# Patient Record
Sex: MALE | Race: WHITE | NOT HISPANIC OR LATINO | Employment: FULL TIME | URBAN - METROPOLITAN AREA
[De-identification: names, ages, dates, MRNs, and addresses within clinical notes are randomized per-mention and may not be internally consistent; named-entity substitution may affect disease eponyms.]

---

## 2017-01-01 ENCOUNTER — TRANSFERRED RECORDS (OUTPATIENT)
Dept: HEALTH INFORMATION MANAGEMENT | Facility: CLINIC | Age: 27
End: 2017-01-01

## 2017-01-13 DIAGNOSIS — E10.9 TYPE 1 DIABETES MELLITUS WITHOUT COMPLICATION (H): Primary | ICD-10-CM

## 2017-01-17 ENCOUNTER — OFFICE VISIT (OUTPATIENT)
Dept: FAMILY MEDICINE | Facility: OTHER | Age: 27
End: 2017-01-17
Payer: COMMERCIAL

## 2017-01-17 VITALS
HEART RATE: 72 BPM | RESPIRATION RATE: 20 BRPM | SYSTOLIC BLOOD PRESSURE: 122 MMHG | HEIGHT: 71 IN | DIASTOLIC BLOOD PRESSURE: 68 MMHG | WEIGHT: 204.8 LBS | BODY MASS INDEX: 28.67 KG/M2 | TEMPERATURE: 95.4 F

## 2017-01-17 DIAGNOSIS — E10.9 TYPE 1 DIABETES MELLITUS WITHOUT COMPLICATION (H): Primary | ICD-10-CM

## 2017-01-17 DIAGNOSIS — E78.5 HYPERLIPIDEMIA LDL GOAL <100: ICD-10-CM

## 2017-01-17 LAB
ALBUMIN SERPL-MCNC: 4 G/DL (ref 3.4–5)
ALP SERPL-CCNC: 65 U/L (ref 40–150)
ALT SERPL W P-5'-P-CCNC: 30 U/L (ref 0–70)
ANION GAP SERPL CALCULATED.3IONS-SCNC: 4 MMOL/L (ref 3–14)
AST SERPL W P-5'-P-CCNC: 21 U/L (ref 0–45)
BILIRUB SERPL-MCNC: 0.4 MG/DL (ref 0.2–1.3)
BUN SERPL-MCNC: 11 MG/DL (ref 7–30)
CALCIUM SERPL-MCNC: 8.7 MG/DL (ref 8.5–10.1)
CHLORIDE SERPL-SCNC: 107 MMOL/L (ref 94–109)
CHOLEST SERPL-MCNC: 137 MG/DL
CO2 SERPL-SCNC: 31 MMOL/L (ref 20–32)
CREAT SERPL-MCNC: 0.89 MG/DL (ref 0.66–1.25)
ERYTHROCYTE [DISTWIDTH] IN BLOOD BY AUTOMATED COUNT: 12.6 % (ref 10–15)
GFR SERPL CREATININE-BSD FRML MDRD: ABNORMAL ML/MIN/1.7M2
GLUCOSE SERPL-MCNC: 101 MG/DL (ref 70–99)
HBA1C MFR BLD: 9.4 % (ref 4.3–6)
HCT VFR BLD AUTO: 41 % (ref 40–53)
HDLC SERPL-MCNC: 51 MG/DL
HGB BLD-MCNC: 14 G/DL (ref 13.3–17.7)
LDLC SERPL CALC-MCNC: 78 MG/DL
MCH RBC QN AUTO: 29.5 PG (ref 26.5–33)
MCHC RBC AUTO-ENTMCNC: 34.1 G/DL (ref 31.5–36.5)
MCV RBC AUTO: 86 FL (ref 78–100)
NONHDLC SERPL-MCNC: 86 MG/DL
PLATELET # BLD AUTO: 280 10E9/L (ref 150–450)
POTASSIUM SERPL-SCNC: 3.7 MMOL/L (ref 3.4–5.3)
PROT SERPL-MCNC: 7.1 G/DL (ref 6.8–8.8)
RBC # BLD AUTO: 4.75 10E12/L (ref 4.4–5.9)
SODIUM SERPL-SCNC: 142 MMOL/L (ref 133–144)
TRIGL SERPL-MCNC: 42 MG/DL
TSH SERPL DL<=0.005 MIU/L-ACNC: 3.61 MU/L (ref 0.4–4)
WBC # BLD AUTO: 3.9 10E9/L (ref 4–11)

## 2017-01-17 PROCEDURE — 84443 ASSAY THYROID STIM HORMONE: CPT | Performed by: PHYSICIAN ASSISTANT

## 2017-01-17 PROCEDURE — 36415 COLL VENOUS BLD VENIPUNCTURE: CPT | Performed by: PHYSICIAN ASSISTANT

## 2017-01-17 PROCEDURE — 80061 LIPID PANEL: CPT | Performed by: PHYSICIAN ASSISTANT

## 2017-01-17 PROCEDURE — 83036 HEMOGLOBIN GLYCOSYLATED A1C: CPT | Performed by: PHYSICIAN ASSISTANT

## 2017-01-17 PROCEDURE — 99207 C FOOT EXAM  NO CHARGE: CPT | Performed by: PHYSICIAN ASSISTANT

## 2017-01-17 PROCEDURE — 85027 COMPLETE CBC AUTOMATED: CPT | Performed by: PHYSICIAN ASSISTANT

## 2017-01-17 PROCEDURE — 99214 OFFICE O/P EST MOD 30 MIN: CPT | Performed by: PHYSICIAN ASSISTANT

## 2017-01-17 PROCEDURE — 80053 COMPREHEN METABOLIC PANEL: CPT | Performed by: PHYSICIAN ASSISTANT

## 2017-01-17 ASSESSMENT — PAIN SCALES - GENERAL: PAINLEVEL: NO PAIN (0)

## 2017-01-17 NOTE — MR AVS SNAPSHOT
"              After Visit Summary   2017    Kashif Rodriguez    MRN: [id number]           Patient Information     Date Of Birth          1990        Visit Information        Provider Department      2017 10:00 AM Miguel Pride PA-C [facility]        Today's Diagnoses     Type 1 diabetes mellitus without complication (H)    -  1     Hyperlipidemia LDL goal <100            Follow-ups after your visit        Who to contact     If you have questions or need follow up information about today's clinic visit or your schedule please contact [facility] directly at [phone].  Normal or non-critical lab and imaging results will be communicated to you by [employer], letter or phone within 4 business days after the clinic has received the results. If you do not hear from us within 7 days, please contact the clinic through [employer] or phone. If you have a critical or abnormal lab result, we will notify you by phone as soon as possible.  Submit refill requests through [employer] or call your pharmacy and they will forward the refill request to us. Please allow 3 business days for your refill to be completed.          Additional Information About Your Visit        MyChart Information     [employer] lets you send messages to your doctor, view your test results, renew your prescriptions, schedule appointments and more. To sign up, go to [email or web address] . Click on \"Log in\" on the left side of the screen, which will take you to the Welcome page. Then click on \"Sign up Now\" on the right side of the page.     You will be asked to enter the access code listed below, as well as some personal information. Please follow the directions to create your username and password.     Your access code is: [email or web address]  Expires: 2017 10:26 AM     Your access code will  in 90 days. If you need help or a new code, please call your [facility] or [phone].        Care EveryWhere ID     " "This is your Care EveryWhere ID. This could be used by other organizations to access your [place] medical records  [id number]        Your Vitals Were     Pulse Temperature Respirations Height BMI (Body Mass Index)       72 95.4  F (35.2  C) (Oral) 20 5' 11.25\" (1.81 m) 28.36 kg/m2        Blood Pressure from Last 3 Encounters:   01/17/17 122/68   09/11/15 114/70   07/14/15 102/76    Weight from Last 3 Encounters:   01/17/17 204 lb 12.8 oz (92.897 kg)   09/11/15 200 lb 11.2 oz (91.037 kg)   07/14/15 195 lb 4.8 oz (88.587 kg)              We Performed the Following     Albumin Random Urine Quantitative     CBC with platelets     Comprehensive metabolic panel     Hemoglobin A1c     Lipid Profile with reflex to direct LDL     TSH with free T4 reflex          Today's Medication Changes          These changes are accurate as of: 1/17/17 10:26 AM.  If you have any questions, ask your nurse or doctor.               Stop taking these medicines if you haven't already. Please contact your care team if you have questions.     insulin lispro 100 UNIT/ML injection   Commonly known as:  humaLOG   Stopped by:  Miguel Pride PA-C           Insulin Lispro 200 UNIT/ML soln   Commonly known as:  HUMALOG KWIKPEN   Stopped by:  Miguel Pride PA-C                Where to get your medicines      These medications were sent to [id number] - [place], [place] - [address] [place] [address]  [address] [place] [id number]    Hours:  M-F 8:30-6:30; Sat 9-4; closed Sunday Phone:  [phone]    - insulin aspart 100 UNIT/ML injection             Primary Care Provider    None       No address on file        Thank you!     Thank you for choosing [facility]  for your care. Our goal is always to provide you with excellent care. Hearing back from our patients is one way we can continue to improve our services. Please take a few minutes to complete the written survey that you may receive in the mail after your visit with us. Thank you!      "        Your Updated Medication List - Protect others around you: Learn how to safely use, store and throw away your medicines at www.disposemymeds.org.          This list is accurate as of: 1/17/17 10:26 AM.  Always use your most recent med list.                   Brand Name Dispense Instructions for use    * ACE NOT PRESCRIBED (INTENTIONAL)      by Other route continuous prn.       * ASPIRIN NOT PRESCRIBED    INTENTIONAL    0 each    continuous prn Antiplatelet medication not prescribed intentionally due to Intolerance (headache)       MAGALI CONTOUR NEXT test strip   Generic drug:  blood glucose monitoring     100 strip    TEST 4 TIMES A DAY       CONTOUR BLOOD GLUCOSE SYSTEM W/DEVICE Kit     1 Kit    Test up to 4 times daily as needed.       glucagon 1 MG kit    GLUCAGON EMERGENCY    1 mg    Inject 1 mg into the muscle once for 1 dose       insulin aspart 100 UNIT/ML injection    NovoLOG    1 vial    INJECT SUB-Q 80 UNITS DAILY PER INSULIN PUMP AND SLIDING SCALE AS NEEDED       loratadine 10 MG tablet    CLARITIN    30 tablet    Take 1 tablet (10 mg) by mouth daily       STATIN NOT PRESCRIBED (INTENTIONAL)      Statin not prescribed intentionally due to Not indicated based on age       * Notice:  This list has 2 medication(s) that are the same as other medications prescribed for you. Read the directions carefully, and ask your doctor or other care provider to review them with you.

## 2017-01-17 NOTE — PROGRESS NOTES
"  SUBJECTIVE:                                                    Kashif Rodriguez is a 26 year old male who presents to clinic today for the following health issues:      Diabetes Follow-up      Patient is checking blood sugars: variably    Diabetic concerns: None     Symptoms of hypoglycemia (low blood sugar): none     Paresthesias (numbness or burning in feet) or sores: No     Date of last diabetic eye exam: 5/2016       Amount of exercise or physical activity: None    Problems taking medications regularly: No    Medication side effects: none    Diet: diabetic and carbohydrate counting    Problem list and histories reviewed & adjusted, as indicated.  Additional history: as documented    Patient Active Problem List   Diagnosis     Type 1 diabetes mellitus without complication (H)     FB (foreign body)     Hyperlipidemia LDL goal <100     History reviewed. No pertinent past surgical history.    Social History   Substance Use Topics     Smoking status: Passive Smoke Exposure - Never Smoker     Smokeless tobacco: Never Used      Comment: works at [employer]      Alcohol Use: No     Family History   Problem Relation Age of Onset     Asthma Father            ROS:  Constitutional, HEENT, cardiovascular, pulmonary, gi and gu systems are negative, except as otherwise noted.    OBJECTIVE:                                                    /68 mmHg  Pulse 72  Temp(Src) 95.4  F (35.2  C) (Oral)  Resp 20  Ht 5' 11.25\" (1.81 m)  Wt 204 lb 12.8 oz (92.897 kg)  BMI 28.36 kg/m2  Body mass index is 28.36 kg/(m^2).  GENERAL: healthy, alert and no distress  EYES: Eyes grossly normal to inspection, PERRL and conjunctivae and sclerae normal  NECK: no adenopathy, no asymmetry, masses, or scars and trachea midline and normal to palpation  RESP: lungs clear to auscultation - no rales, rhonchi or wheezes  CV: regular rate and rhythm, normal S1 S2, no S3 or S4, no murmur, click or rub, no peripheral edema and peripheral pulses " strong  MS: no gross musculoskeletal defects noted, no edema  SKIN: no suspicious lesions or rashes  PSYCH: mentation appears normal, affect normal/bright  Diabetic foot exam: normal DP and PT pulses, slight trophic changes to the ball of the right foot but no ulcerative lesions and normal sensory exam    Diagnostic Test Results:  No results found for this or any previous visit (from the past 24 hour(s)).     ASSESSMENT/PLAN:                                                    Kashif was seen today for diabetes.    Diagnoses and all orders for this visit:    Type 1 diabetes mellitus without complication (H)  Comments:  needs pump adjustment and follow-up  Orders:  -     CBC with platelets  -     Comprehensive metabolic panel  -     Hemoglobin A1c  -     Lipid Profile with reflex to direct LDL  -     TSH with free T4 reflex  -     Cancel: Albumin Random Urine Quantitative  -     insulin aspart (NovoLOG) inject - to fill ambulatory pump; INJECT SUB-Q 80 UNITS DAILY PER INSULIN PUMP AND SLIDING SCALE AS NEEDED  -     DIABETES EDUCATOR REFERRAL  -     Albumin Random Urine Quantitative; Future    Hyperlipidemia LDL goal <100  -     CBC with platelets  -     Comprehensive metabolic panel  -     Hemoglobin A1c  -     Lipid Profile with reflex to direct LDL  -     TSH with free T4 reflex  -     Cancel: Albumin Random Urine Quantitative    ROV 3 months advised.  Needs to see diabetic education for pump adjustment.  Refilled medications for 3 months.  Advised that he establish care with PCP of choice.  Miguel Peña PA-C  [facility]

## 2017-01-17 NOTE — NURSING NOTE
"Chief Complaint   Patient presents with     Diabetes       Initial /68 mmHg  Pulse 72  Temp(Src) 95.4  F (35.2  C) (Oral)  Resp 20  Ht 5' 11.25\" (1.81 m)  Wt 204 lb 12.8 oz (92.897 kg)  BMI 28.36 kg/m2 Estimated body mass index is 28.36 kg/(m^2) as calculated from the following:    Height as of this encounter: 5' 11.25\" (1.81 m).    Weight as of this encounter: 204 lb 12.8 oz (92.897 kg).  BP completed using cuff size: monty GREWAL LPN      "

## 2017-01-17 NOTE — Clinical Note
[facility]  [address] [facility] [address]  Phone: [phone]          January 17, 2017    Kashif Rodriguez  [address] [facility] [address]          Dear Kashif,      LAB RESULTS:     The results of your recent multiple labs were ABNORMAL but in your case are quite acceptable.  Follow-up with diabetic education for pump management as discussed.    Recheck in 3 months advised.    If you have any further questions or problems, please contact our office.          Sincerely,      Miguel Murphy PA-C

## 2017-01-24 ENCOUNTER — TELEPHONE (OUTPATIENT)
Dept: FAMILY MEDICINE | Facility: OTHER | Age: 27
End: 2017-01-24

## 2017-01-24 DIAGNOSIS — E10.9 TYPE 1 DIABETES MELLITUS WITHOUT COMPLICATION (H): Primary | ICD-10-CM

## 2017-01-24 NOTE — TELEPHONE ENCOUNTER
Panel Management Review      Patient has the following on his problem list:       Composite cancer screening  Chart review shows that this patient is due/due soon for the following   Summary:    Patient is due/failing the following:   Diabetic eye exam, and microalbumin    Action needed:   Microalbumin ( urine test)  And diabetic eye exam.    Type of outreach:    Phone, left message for patient to call back.     Questions for provider review:    None                                                                                                                                    Brianne GREWAL LPN       Chart routed to Brianne GREWAL LPN

## 2017-01-31 NOTE — TELEPHONE ENCOUNTER
Phone, spoke to patient.  He stated he will complete the microalbumin when he sees the diabetic educator.

## 2017-02-08 ENCOUNTER — ALLIED HEALTH/NURSE VISIT (OUTPATIENT)
Dept: EDUCATION SERVICES | Facility: CLINIC | Age: 27
End: 2017-02-08
Payer: COMMERCIAL

## 2017-02-08 DIAGNOSIS — E10.9 TYPE 1 DIABETES MELLITUS WITHOUT COMPLICATION (H): Primary | ICD-10-CM

## 2017-02-08 PROCEDURE — G0108 DIAB MANAGE TRN  PER INDIV: HCPCS

## 2017-02-08 NOTE — PROGRESS NOTES
Diabetes Self Management Training: Insulin Pump Review Visit    Kashif J Bestarfranchesca presents today for evaluation of glucose control and Insulin pump download, review and adjustment of pump settings   related to Type 1 diabetes.    He is accompanied by self    Patient's diabetes management related comments/concerns: getting low after work and then eats and goes high during night. Uses bolus wizard for BG corrections but not carbs. He does 1u/10 grams just like the wizard is set at but is quicker to manual bolus.     Patient's emotional response to diabetes: expresses readiness to learn and concern for health and well-being    Patient would like this visit to be focused around the following diabetes-related behaviors and goals: Insulin pump download, review and adjustment of pump settings      ASSESSMENT:  Patient Problem List and Family Medical History reviewed for relevant medical history, current medical status, and diabetes risk factors.    Current Diabetes Management per Patient:  Insulin Pump Type: Medtronic Minimed 530G with Enlite Sensor    BG meter: Contour Next USB Link (with Medtronic Pump) meter    Taking other diabetes medications?   yes:     Diabetes Medication(s)     Diabetic Other Sig    glucagon (GLUCAGON EMERGENCY) 1 MG injection Inject 1 mg into the muscle once for 1 dose    Insulin Sig    insulin aspart (NovoLOG) inject - to fill ambulatory pump INJECT SUB-Q 80 UNITS DAILY PER INSULIN PUMP AND SLIDING SCALE AS NEEDED    insulin aspart (NovoLOG) inject - to fill ambulatory pump INJECT SUB-Q 80 UNITS DAILY PER INSULIN PUMP AND SLIDING SCALE AS NEEDED          Pump Problem Solving: has had problems running out of insulin; needs prescription updated    Blood Glucose Results and Insulin Use: See scanned pump report for details.    Current Pump Settings: See Insulin Pump - Outpatient in Medication List for complete list of settings.       Insulin Use:  Average Total Daily Insulin:    84 units/day  "  Basal:    44%   Bolus:    56%     BG Data:  BG Checks  3-4/day   Average     Sensor BG Average 173     BG values are: 34% in  range    Patient's most recent A1C      9.4   1/17/2017 is not meeting goal of <7.0    Nutrition:  Patient counts carbohydrates in grams. Works 4pm - 12am. Meals are noon, 5pm, 7pm, and often eats at 9pm and 1am due to low BG    Physical Activity:    Type: Active at work, home projects and 1 and 3 year old.   Limitations: none noted  Patient suspends pump if needed.     Diabetes Complications:  Acute Complications: At risk for hypoglycemia? yes  Symptoms of low blood sugar? shaking  Frequency of hypoglycemia: several times/week    Vitals:  There were no vitals taken for this visit.  Estimated body mass index is 28.36 kg/(m^2) as calculated from the following:    Height as of 1/17/17: 1.81 m (5' 11.25\").    Weight as of 1/17/17: 92.897 kg (204 lb 12.8 oz).   Last 3 BP:   BP Readings from Last 3 Encounters:   01/17/17 122/68   09/11/15 114/70   07/14/15 102/76       History   Smoking status     Passive Smoke Exposure - Never Smoker   Smokeless tobacco     Never Used     Comment: works at [employer]        Labs:  A1C      9.4   1/17/2017  GLC      101   1/17/2017  LDL       78   1/17/2017  HDL CHOLESTEROL   Date Value Ref Range Status   01/17/2017 51 >39 mg/dL Final   ]  GFR ESTIMATE   Date Value Ref Range Status   01/17/2017 >90  Non  GFR Calc   >60 mL/min/1.7m2 Final     GFR ESTIMATE IF BLACK   Date Value Ref Range Status   01/17/2017 >90   GFR Calc   >60 mL/min/1.7m2 Final     CR     0.89   1/17/2017  No results found for this basename: microalbumin    Socio/Economic History:    Support system: spouse/significant other    Health Beliefs and Attitudes:   Patient Activation Measure Survey Score:  MIKHAIL Score (Last Two) 1/12/2011   MIKHAIL Raw Score 52   Activation Score 100   MIKHAIL Level 4       Stage of Change: PREPARATION (Decided to change - considering " how)      Diabetes knowledge and skills assessment:     Patient is knowledgeable in diabetes management concepts related to: Monitoring and pump management    Patient needs further education on the following diabetes management concepts:     Barriers to Learning Assessment: No Barriers identified    Based on learning assessment above, most appropriate setting for further diabetes education would be: Individual setting.    INTERVENTION:  Patient would benefit from decrease in basal rate evening and using bolus wizard more.    Changes made to pump settings:  basal rate: 10 pm rate changed to 8pm still at 1.3    Changes made to sensor settings:     Education provided today on:  AADE Self-Care Behaviors:    Education specific to insulin pump provided today on:   Review of pump reports , review of infusion sets.     Pt verbalized understanding of concepts discussed and recommendations provided today.       Education Materials Provided:  Pump download reports.       PLAN:  Evening basal rate decreased slightly today. He may need increase in basal during sleep time 3a to 9am and lower basal after waking up because he has had some morning lows when he had to get up earlier.     FOLLOW-UP:  Follow-up as needed.   Chart routed to referring provider.    Ongoing plan for education and support: follow up as needed for adjustments. Insurance coverage may be a barrier.     Rita Julien RDN, OSCAR, CDE    Time Spent: 60 minutes  Encounter Type: Individual    Download data sent to be scanned into this Epic encounter.    Any diabetes medication dose changes were made via the CDE Protocol and Collaborative Practice Agreement with the patient's primary care provider. A copy of this encounter was shared with the provider.

## 2017-05-31 ENCOUNTER — TELEPHONE (OUTPATIENT)
Dept: FAMILY MEDICINE | Facility: OTHER | Age: 27
End: 2017-05-31

## 2017-05-31 NOTE — LETTER
[facility]  [address] [facility] [address]  [phone]        June 13, 2017    [facility]  [address] [facility] [address]          Dear Kashif,    In coordination of your health care, we see you are overdue for diabetes office visit.  Please schedule an appointment with your primary provider.        If you have any questions or problems, please give us a call at the clinic.       Sincerely,        Miguel Murphy PA-C/rd,ASHANTIN

## 2017-05-31 NOTE — TELEPHONE ENCOUNTER
Panel Management Review    Attempted outreach to patient: Left message    Patient is due for:  Diabetic office visit.    If patient had this completed elsewhere, please obtain approximate date, clinic/hospital where test was done and the result (abnormal/normal).    Please assist in scheduling if not completed.            Patient has the following on his problem list:       Composite cancer screening  Chart review shows that this patient is due/due soon for the following   Summary:    Patient is due/failing the following:   Diabetic visit and A1C    Action needed:   Patient needs office visit for diabetic visit.    Type of outreach:    Phone, left message for patient to call back.     Questions for provider review:    None                                                                                                                                    Brianne GREWAL LPN       Chart routed to Brianne GREWAL LPN   .

## 2017-06-06 NOTE — TELEPHONE ENCOUNTER
Panel Management Review      Patient has the following on his problem list:       Composite cancer screening  Chart review shows that this patient is due/due soon for the following   Summary:    Patient is due/failing the following:   Diabetic office visit    Action needed:   Patient needs office visit for diabetes.    Type of outreach:    Phone, left message for patient to call back.     Questions for provider review:    None                                                                                                                                    Brianne GREWAL LPN       Chart routed to Brianne GREWAL LPN   .

## 2017-08-03 ENCOUNTER — OFFICE VISIT (OUTPATIENT)
Dept: FAMILY MEDICINE | Facility: OTHER | Age: 27
End: 2017-08-03
Payer: COMMERCIAL

## 2017-08-03 VITALS
WEIGHT: 202.4 LBS | RESPIRATION RATE: 12 BRPM | BODY MASS INDEX: 28.03 KG/M2 | SYSTOLIC BLOOD PRESSURE: 118 MMHG | TEMPERATURE: 95.4 F | HEART RATE: 64 BPM | DIASTOLIC BLOOD PRESSURE: 60 MMHG

## 2017-08-03 DIAGNOSIS — E78.5 HYPERLIPIDEMIA LDL GOAL <100: ICD-10-CM

## 2017-08-03 DIAGNOSIS — E10.9 TYPE 1 DIABETES MELLITUS WITHOUT COMPLICATION (H): ICD-10-CM

## 2017-08-03 DIAGNOSIS — E10.9 TYPE 1 DIABETES MELLITUS WITHOUT COMPLICATION (H): Primary | ICD-10-CM

## 2017-08-03 LAB
ALBUMIN SERPL-MCNC: 4 G/DL (ref 3.4–5)
ALP SERPL-CCNC: 61 U/L (ref 40–150)
ALT SERPL W P-5'-P-CCNC: 21 U/L (ref 0–70)
ANION GAP SERPL CALCULATED.3IONS-SCNC: 8 MMOL/L (ref 3–14)
AST SERPL W P-5'-P-CCNC: 13 U/L (ref 0–45)
BILIRUB SERPL-MCNC: 0.6 MG/DL (ref 0.2–1.3)
BUN SERPL-MCNC: 14 MG/DL (ref 7–30)
CALCIUM SERPL-MCNC: 8.6 MG/DL (ref 8.5–10.1)
CHLORIDE SERPL-SCNC: 101 MMOL/L (ref 94–109)
CO2 SERPL-SCNC: 29 MMOL/L (ref 20–32)
CREAT SERPL-MCNC: 0.96 MG/DL (ref 0.66–1.25)
CREAT UR-MCNC: 109 MG/DL
GFR SERPL CREATININE-BSD FRML MDRD: ABNORMAL ML/MIN/1.7M2
GLUCOSE SERPL-MCNC: 337 MG/DL (ref 70–99)
HBA1C MFR BLD: 8.6 % (ref 4.3–6)
LDLC SERPL DIRECT ASSAY-MCNC: 89 MG/DL
MICROALBUMIN UR-MCNC: 5 MG/L
MICROALBUMIN/CREAT UR: 4.65 MG/G CR (ref 0–17)
POTASSIUM SERPL-SCNC: 4.2 MMOL/L (ref 3.4–5.3)
PROT SERPL-MCNC: 7.1 G/DL (ref 6.8–8.8)
SODIUM SERPL-SCNC: 138 MMOL/L (ref 133–144)

## 2017-08-03 PROCEDURE — 82043 UR ALBUMIN QUANTITATIVE: CPT | Performed by: PHYSICIAN ASSISTANT

## 2017-08-03 PROCEDURE — 80053 COMPREHEN METABOLIC PANEL: CPT | Performed by: PHYSICIAN ASSISTANT

## 2017-08-03 PROCEDURE — 83036 HEMOGLOBIN GLYCOSYLATED A1C: CPT | Performed by: PHYSICIAN ASSISTANT

## 2017-08-03 PROCEDURE — 83721 ASSAY OF BLOOD LIPOPROTEIN: CPT | Performed by: PHYSICIAN ASSISTANT

## 2017-08-03 PROCEDURE — 36415 COLL VENOUS BLD VENIPUNCTURE: CPT | Performed by: PHYSICIAN ASSISTANT

## 2017-08-03 PROCEDURE — 99213 OFFICE O/P EST LOW 20 MIN: CPT | Performed by: PHYSICIAN ASSISTANT

## 2017-08-03 ASSESSMENT — PAIN SCALES - GENERAL: PAINLEVEL: NO PAIN (0)

## 2017-08-03 NOTE — PROGRESS NOTES
SUBJECTIVE:                                                    Kashif Rodriguez is a 27 year old male who presents to clinic today for the following health issues:      Diabetes Follow-up      Patient is checking blood sugars: variably.  Results range variable    Diabetic concerns: None     Symptoms of hypoglycemia (low blood sugar): none     Paresthesias (numbness or burning in feet) or sores: No     Date of last diabetic eye exam: due        Amount of exercise or physical activity: 2-3 days/week for an average of 15-30 minutes    Problems taking medications regularly: No    Medication side effects: none  Diet: diabetic    Problem list and histories reviewed & adjusted, as indicated.  Additional history: as documented    Patient Active Problem List   Diagnosis     Type 1 diabetes mellitus without complication (H)     FB (foreign body)     Hyperlipidemia LDL goal <100     History reviewed. No pertinent surgical history.    Social History   Substance Use Topics     Smoking status: Passive Smoke Exposure - Never Smoker     Smokeless tobacco: Never Used      Comment: works at [employer]      Alcohol use No     Family History   Problem Relation Age of Onset     Asthma Father              Reviewed and updated as needed this visit by clinical staffTobacco  Allergies  Med Hx  Surg Hx  Fam Hx  Soc Hx      Reviewed and updated as needed this visit by Provider         ROS:  Constitutional, HEENT, cardiovascular, pulmonary, gi and gu systems are negative, except as otherwise noted.      OBJECTIVE:   /60 (BP Location: Right arm, Patient Position: Chair, Cuff Size: Adult Large)  Pulse 64  Temp 95.4  F (35.2  C) (Oral)  Resp 12  Wt 202 lb 6.4 oz (91.8 kg)  BMI 28.03 kg/m2  Body mass index is 28.03 kg/(m^2).  GENERAL: healthy, alert and no distress  NECK: no adenopathy, no asymmetry, masses, or scars and thyroid normal to palpation  RESP: lungs clear to auscultation - no rales, rhonchi or wheezes  CV: regular  rate and rhythm, normal S1 S2, no S3 or S4, no murmur, click or rub, no peripheral edema and peripheral pulses strong  ABDOMEN: soft, nontender, no hepatosplenomegaly, no masses and bowel sounds normal  MS: no gross musculoskeletal defects noted, no edema  Diabetic foot exam: normal DP and PT pulses, no trophic changes or ulcerative lesions and normal sensory exam    Diagnostic Test Results:  Results for orders placed or performed in visit on 08/03/17 (from the past 24 hour(s))   Hemoglobin A1c   Result Value Ref Range    Hemoglobin A1C 8.6 (H) 4.3 - 6.0 %       ASSESSMENT/PLAN:     1. Type 1 diabetes mellitus without complication (H)  Slightly improved, needs to see diabetic education for pump adjustment.  - Hemoglobin A1c  - LDL cholesterol direct; Future  - Comprehensive metabolic panel  - Albumin Random Urine Quantitative  - insulin aspart (NovoLOG) inject - to fill ambulatory pump; INJECT SUB-Q 80 UNITS DAILY PER INSULIN PUMP AND SLIDING SCALE AS NEEDED  Dispense: 3 vial; Refill: 3  - LDL cholesterol direct    2. Hyperlipidemia LDL goal <100  Labs pending.  - Hemoglobin A1c  - LDL cholesterol direct; Future  - Comprehensive metabolic panel  - LDL cholesterol direct    ROV 3-6 months depending on diabetic eds advise.  Miguel Peña PA-C  [facility]

## 2017-08-03 NOTE — LETTER
[facility]  [address] [facility] [address]  [phone]        August 3, 2017    Kashif Rodriguez  [address] [facility] [address]          Dear Kashif,    Enclosed is a copy of your labs from 8/3/2017. It is very important that you see diabetic education for pump management and adjustment of your  medications.     If you have any questions or problems, please give us a call at the clinic.     Sincerely,        Miguel Murphy PA-C/rd,ASHANTIN

## 2017-08-03 NOTE — NURSING NOTE
"Chief Complaint   Patient presents with     Diabetes       Initial /60 (BP Location: Right arm, Patient Position: Chair, Cuff Size: Adult Large)  Pulse 64  Temp 95.4  F (35.2  C) (Oral)  Resp 12  Wt 202 lb 6.4 oz (91.8 kg)  BMI 28.03 kg/m2 Estimated body mass index is 28.03 kg/(m^2) as calculated from the following:    Height as of 1/17/17: 5' 11.25\" (1.81 m).    Weight as of this encounter: 202 lb 6.4 oz (91.8 kg).  Medication Reconciliation: complete       Brianne GREWAL LPN      "

## 2017-08-03 NOTE — MR AVS SNAPSHOT
"              After Visit Summary   8/3/2017    Kashif Rodriguez    MRN: [id number]           Patient Information     Date Of Birth          1990        Visit Information        Provider Department      8/3/2017 8:20 AM Miguel Pride PA-C [facility]        Today's Diagnoses     Type 1 diabetes mellitus without complication (H)    -  1    Hyperlipidemia LDL goal <100        Type 1 diabetes mellitus without complication (H)           Follow-ups after your visit        Who to contact     If you have questions or need follow up information about today's clinic visit or your schedule please contact [facility] directly at [phone].  Normal or non-critical lab and imaging results will be communicated to you by MyChart, letter or phone within 4 business days after the clinic has received the results. If you do not hear from us within 7 days, please contact the clinic through MyChart or phone. If you have a critical or abnormal lab result, we will notify you by phone as soon as possible.  Submit refill requests through Anywhere to Go or call your pharmacy and they will forward the refill request to us. Please allow 3 business days for your refill to be completed.          Additional Information About Your Visit        MyChart Information     Anywhere to Go lets you send messages to your doctor, view your test results, renew your prescriptions, schedule appointments and more. To sign up, go to [email or web address] . Click on \"Log in\" on the left side of the screen, which will take you to the Welcome page. Then click on \"Sign up Now\" on the right side of the page.     You will be asked to enter the access code listed below, as well as some personal information. Please follow the directions to create your username and password.     Your access code is: [email or web address]  Expires: 2017  8:41 AM     Your access code will  in 90 days. If you need help or a new code, please call your [facility] " clinic or [phone].        Care EveryWhere ID     This is your Care EveryWhere ID. This could be used by other organizations to access your [place] medical records  [id number]        Your Vitals Were     Pulse Temperature Respirations BMI (Body Mass Index)          64 95.4  F (35.2  C) (Oral) 12 28.03 kg/m2         Blood Pressure from Last 3 Encounters:   08/03/17 118/60   01/17/17 122/68   09/11/15 114/70    Weight from Last 3 Encounters:   08/03/17 202 lb 6.4 oz (91.8 kg)   01/17/17 204 lb 12.8 oz (92.9 kg)   09/11/15 200 lb 11.2 oz (91 kg)              We Performed the Following     Albumin Random Urine Quantitative     Comprehensive metabolic panel     Hemoglobin A1c          Where to get your medicines      These medications were sent to [id number] [place], [address]  [address] [place] [id number]    Hours:  M-F 8:30-6:30; Sat 9-4; closed Sunday Phone:  [phone]     insulin aspart 100 UNIT/ML injection          Primary Care Provider    None       No address on file        Equal Access to Services     SARANYA STEPHEN : Hadii flip bonilla hadasho Somario, waaxda luqadaha, qaybta kaalmada adejuan cda, young tejeda . So [facility] [phone].    ATENCIÓN: Si habla español, tiene a chakraborty disposición servicios gratuitos de asistencia lingüística. Amberame al [phone].    We comply with applicable federal civil rights laws and [place] laws. We do not discriminate on the basis of race, color, national origin, age, disability sex, sexual orientation or gender identity.            Thank you!     Thank you for choosing [facility]  for your care. Our goal is always to provide you with excellent care. Hearing back from our patients is one way we can continue to improve our services. Please take a few minutes to complete the written survey that you may receive in the mail after your visit with us. Thank you!             Your Updated Medication List - Protect  others around you: Learn how to safely use, store and throw away your medicines at www.disposemymeds.org.          This list is accurate as of: 8/3/17  8:41 AM.  Always use your most recent med list.                   Brand Name Dispense Instructions for use Diagnosis    * ACE NOT PRESCRIBED (INTENTIONAL)      by Other route continuous prn.    Type 1 diabetes, HbA1c goal < 7% (H)       * ASPIRIN NOT PRESCRIBED    INTENTIONAL    0 each    continuous prn Antiplatelet medication not prescribed intentionally due to Intolerance (headache)        MAGALI CONTOUR NEXT test strip   Generic drug:  blood glucose monitoring     100 strip    TEST 4 TIMES A DAY    Type 1 diabetes, HbA1c goal < 7% (H)       CONTOUR BLOOD GLUCOSE SYSTEM W/DEVICE Kit     1 Kit    Test up to 4 times daily as needed.    Type I (juvenile type) diabetes mellitus without mention of complication, not stated as uncontrolled       glucagon 1 MG kit    GLUCAGON EMERGENCY    1 mg    Inject 1 mg into the muscle once for 1 dose    Type 1 diabetes, HbA1c goal < 7% (H)       insulin aspart 100 UNIT/ML injection    NovoLOG    3 vial    INJECT SUB-Q 80 UNITS DAILY PER INSULIN PUMP AND SLIDING SCALE AS NEEDED    Type 1 diabetes mellitus without complication (H)       * insulin pump infusion     1 each    Date last updated:  2/8/17 Medtronic Minimed: Model 530G BASAL RATES : 12   AM (midnight): .9 units/hour , 5AM  1.2, 7AM 2.0,  12P 2.0,  10PM 1.3 CARB RATIO: 12   AM (midnight): 10 grams Corection Factor (Sensitivity): 12   AM (midnight): 40 mg/dL BLOOD GLUCOSE TARGET: 12   AM (midnight): 90 - 100 1     AM:  100 - 120 10   AM:  90 - 100 Active Insulin Time:  4 hours Sensor:  Yes: SENSOR GLUCOSE LIMITS: 12   AM (midnight): 70 - 300    Type 1 diabetes mellitus without complication (H)       loratadine 10 MG tablet    CLARITIN    30 tablet    Take 1 tablet (10 mg) by mouth daily    URI (upper respiratory infection)       STATIN NOT PRESCRIBED (INTENTIONAL)      Statin  not prescribed intentionally due to Not indicated based on age        * Notice:  This list has 3 medication(s) that are the same as other medications prescribed for you. Read the directions carefully, and ask your doctor or other care provider to review them with you.

## 2017-11-06 ENCOUNTER — TELEPHONE (OUTPATIENT)
Dept: FAMILY MEDICINE | Facility: OTHER | Age: 27
End: 2017-11-06

## 2017-11-06 DIAGNOSIS — E10.9 TYPE 1 DIABETES MELLITUS WITHOUT COMPLICATION (H): Primary | ICD-10-CM

## 2017-11-06 NOTE — LETTER
[facility]  [address] [facility] [address]  Phone: [phone]  November 29, 2017      Kashif Rodriguez  [address] [facility] [address]      Dear Kashif,    We care about your health and have reviewed your health plan including your medical conditions, medications, and lab results.  Based on this review, it is recommended that you follow up regarding the following health topic(s):  -Diabetes    We recommend you take the following action(s):  -schedule a FOLLOWUP OFFICE APPOINTMENT.  We will perform the following labs:  A1c.     Please call us at the [facility] - [phone] (or use [employer]) to address the above recommendations.     Thank you for trusting [facility] and we appreciate the opportunity to serve you.  We look forward to supporting your healthcare needs in the future.    Healthy Regards,    Your Health Care Team  [facility]

## 2017-11-06 NOTE — TELEPHONE ENCOUNTER
Summary:    Patient is due/failing the following:   A1C and FOLLOW UP with diabetic education     Action needed:   Patient needs office visit for follow up with diabetic education. and Patient needs non-fasting lab only appointment    Type of outreach:    Phone, spoke to patient.  patient states he cannot afford to see Diabetic Education     Questions for provider review:    Patient states he is cant afford to see diabetic education but will call back to schedule an OV                                                                                                                                     Yamileth Campbell       Chart routed to Provider .      Panel Management Review      Patient has the following on his problem list:     Diabetes    ASA:     Last A1C  Lab Results   Component Value Date    A1C 8.6 08/03/2017    A1C 9.4 01/17/2017    A1C 7.9 09/11/2015    A1C 8.7 02/20/2015    A1C 9.0 08/19/2014     A1C tested: Failed    Last LDL:    Lab Results   Component Value Date    CHOL 137 01/17/2017     Lab Results   Component Value Date    HDL 51 01/17/2017     Lab Results   Component Value Date    LDL 89 08/03/2017    LDL 78 01/17/2017     Lab Results   Component Value Date    TRIG 42 01/17/2017     Lab Results   Component Value Date    CHOLHDLRATIO 2.2 08/19/2014     Lab Results   Component Value Date    NHDL 86 01/17/2017       Is the patient on a Statin? NO             Is the patient on Aspirin? 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

## 2017-11-20 DIAGNOSIS — E10.9 TYPE 1 DIABETES, HBA1C GOAL < 7% (H): ICD-10-CM

## 2017-11-22 NOTE — TELEPHONE ENCOUNTER
Requested Prescriptions   Pending Prescriptions Disp Refills     blood glucose monitoring (MAGALI CONTOUR NEXT) test strip 100 strip prn     Sig: Use to test blood sugar 4 times daily or as directed.    Diabetic Supplies Protocol Passed    11/20/2017  8:28 AM       Passed - Patient is 18 years of age or older       Passed - Patient has had appt within past 6 mos    IV to PO - Antibiotics     None          Last ov 08/03/2017          Prescription approved per [facility] Refill Protocol.  Agusto Oakes, RN, BSN

## 2017-11-25 DIAGNOSIS — E10.9 TYPE 1 DIABETES, HBA1C GOAL < 7% (H): ICD-10-CM

## 2017-11-27 RX ORDER — LANCETS
EACH MISCELLANEOUS
Qty: 200 EACH | Refills: 3 | Status: SHIPPED | OUTPATIENT
Start: 2017-11-27 | End: 2018-12-10

## 2017-11-27 NOTE — TELEPHONE ENCOUNTER
Requested Prescriptions   Pending Prescriptions Disp Refills     MAGALI CONTOUR NEXT test strip [Pharmacy Med Name: CONTOUR NEXT TEST STRIP] 100 each      Sig: TEST 4 TIMES A DAY    Diabetic Supplies Protocol Passed    11/25/2017  9:37 AM       Passed - Patient is 18 years of age or older       Passed - Patient has had appt within past 6 mos    IV to PO - Antibiotics     None          Last ov 08/03/2017          Prescription approved per [facility] Refill Protocol.  Agusto Oakes, RN, BSN

## 2018-01-11 DIAGNOSIS — E10.9 TYPE 1 DIABETES MELLITUS WITHOUT COMPLICATION (H): ICD-10-CM

## 2018-01-15 ENCOUNTER — TELEPHONE (OUTPATIENT)
Dept: FAMILY MEDICINE | Facility: OTHER | Age: 28
End: 2018-01-15

## 2018-01-15 NOTE — TELEPHONE ENCOUNTER
Forms have been completed, signed, faxed/mailed, and sent to scanning.  Codi Gregory CMA ([facility])

## 2018-01-15 NOTE — TELEPHONE ENCOUNTER
Reason for call:  Form  Reason for Call:  Form, our goal is to have forms completed with 72 hours, however, some forms may require a visit or additional information.    Type of letter, form or note:  [employer] Reimbursement Support Program    Who is the form from?: [employer]  (if other please explain)    Where did the form come from: form was faxed in    What clinic location was the form placed at?: [facility] - [phone]    Where the form was placed: Doctors box    What number is listed as a contact on the form?: [phone] # [phone]       Additional comments:     Call taken on 1/15/2018 at 3:05 PM by Judith Ramírez

## 2018-01-15 NOTE — TELEPHONE ENCOUNTER
Attempted to contact patient, mailbox was full unable to leave a message, letter sent  Closing encounter  Lyssa Ramírez RT (R)

## 2018-01-15 NOTE — TELEPHONE ENCOUNTER
"Requested Prescriptions   Pending Prescriptions Disp Refills     insulin aspart (NovoLOG) inject - to fill ambulatory pump 3 vial 3     Sig: INJECT SUB-Q 80 UNITS DAILY PER INSULIN PUMP AND SLIDING SCALE AS NEEDED    Short Acting Insulin Protocol Failed    1/11/2018  3:26 PM       Failed - HgbA1C in past 3 or 6 months    Recent Labs   Lab Test  08/03/17   0822   A1C  8.6*          Passed - Blood pressure less than 140/90 in past 6 months    BP Readings from Last 3 Encounters:   08/03/17 118/60   01/17/17 122/68   09/11/15 114/70          Passed - LDL on file in past 12 months    Recent Labs   Lab Test  08/03/17   0821   LDL  89          Passed - Microalbumin on file in past 12 months    Recent Labs   Lab Test  08/03/17   0821   MICROL  5   UMALCR  4.65          Passed - Serum creatinine on file in past 12 months    Recent Labs   Lab Test  08/03/17   0822   CR  0.96          Passed - Patient is age 18 or older       Passed - Recent visit with authorizing provider's specialty in past 6 months    Patient had office visit in the last 6 months or has a visit in the next 30 days with authorizing provider.  See \"Patient Info\" tab in inbasket, or \"Choose Columns\" in Meds & Orders section of the refill encounter.           insulin aspart (NovoLOG) inject - to fill ambulatory pump  Medication is being filled for 1 time refill only due to:  Patient needs to be seen because due for 6 month follow up.   Please assist with scheduling.    Kamryn Gaitan, RN, BSN       "

## 2018-02-06 DIAGNOSIS — E10.9 TYPE 1 DIABETES MELLITUS WITHOUT COMPLICATION (H): ICD-10-CM

## 2018-02-08 ENCOUNTER — TELEPHONE (OUTPATIENT)
Dept: FAMILY MEDICINE | Facility: OTHER | Age: 28
End: 2018-02-08

## 2018-02-08 NOTE — TELEPHONE ENCOUNTER
Refill not appropriate.  Rx sent to the requesting pharmacy on 1/15/18 for a 3 vials supply with an additional 0 refills.  This was a mary refill.  Pt needs to be seen because due for 6 month f/u.    Jacqueline Baeza RN

## 2018-02-08 NOTE — TELEPHONE ENCOUNTER
Please start a Prior Authorization for Contour Next Test Strips, due to the patients insulin pump requires these specific strips.     #[phone]  ID#[id number]    Thank you  Lyssa LopezR)

## 2018-02-10 NOTE — TELEPHONE ENCOUNTER
Central Prior Authorization Team   Phone: [phone]    Attempted a prior auth and insurance states medication does not need one that there is a problem on the pharmacy end. Called and spoke with pharmacy and they stated that they will look into and call the PA team back if they need any additional assistance.

## 2018-02-26 ENCOUNTER — OFFICE VISIT (OUTPATIENT)
Dept: FAMILY MEDICINE | Facility: OTHER | Age: 28
End: 2018-02-26
Payer: COMMERCIAL

## 2018-02-26 VITALS
OXYGEN SATURATION: 99 % | HEIGHT: 72 IN | HEART RATE: 87 BPM | SYSTOLIC BLOOD PRESSURE: 122 MMHG | RESPIRATION RATE: 20 BRPM | DIASTOLIC BLOOD PRESSURE: 82 MMHG | BODY MASS INDEX: 28.44 KG/M2 | WEIGHT: 210 LBS | TEMPERATURE: 97.4 F

## 2018-02-26 DIAGNOSIS — E10.9 TYPE 1 DIABETES MELLITUS WITHOUT COMPLICATION (H): ICD-10-CM

## 2018-02-26 DIAGNOSIS — Z76.89 ESTABLISHING CARE WITH NEW DOCTOR, ENCOUNTER FOR: Primary | ICD-10-CM

## 2018-02-26 DIAGNOSIS — L60.0 INGROWN NAIL: ICD-10-CM

## 2018-02-26 DIAGNOSIS — E78.5 HYPERLIPIDEMIA LDL GOAL <100: ICD-10-CM

## 2018-02-26 LAB — HBA1C MFR BLD: 8.8 % (ref 4.3–6)

## 2018-02-26 PROCEDURE — 36415 COLL VENOUS BLD VENIPUNCTURE: CPT | Performed by: NURSE PRACTITIONER

## 2018-02-26 PROCEDURE — 99214 OFFICE O/P EST MOD 30 MIN: CPT | Performed by: NURSE PRACTITIONER

## 2018-02-26 PROCEDURE — 83036 HEMOGLOBIN GLYCOSYLATED A1C: CPT | Performed by: NURSE PRACTITIONER

## 2018-02-26 RX ORDER — ASPIRIN 81 MG/1
81 TABLET, CHEWABLE ORAL DAILY
COMMUNITY
Start: 2018-02-26 | End: 2019-12-11

## 2018-02-26 NOTE — PROGRESS NOTES
SUBJECTIVE:   Kashif Rodriguez is a 27 year old male who presents to clinic today for the following health issues:      New Patient/Transfer of Care    Patient has previously been under the care of ISRAEL Zurita who has left the clinic.  Patient requests transfer care to me. Patient medications reconciled, PMH and Problem list reviewed and HM updated.      Diabetes Follow-up    Patient is checking blood sugars: twice daily.    Blood sugar testing frequency justification: Uncontrolled diabetes  Results are as follows:         am - 120-130  Higher with using Novolog-R         suppertime - 150's higher with using Novolog-R    Diabetic concerns: blood sugar frequently over 200     Symptoms of hypoglycemia (low blood sugar): shaky, dizzy, weak     Paresthesias (numbness or burning in feet) or sores: Yes has ingrown toenail     Date of last diabetic eye exam: 6-8 mo ago    BP Readings from Last 2 Encounters:   02/26/18 122/82   08/03/17 118/60     Hemoglobin A1C (%)   Date Value   02/26/2018 8.8 (H)   08/03/2017 8.6 (H)     LDL Cholesterol Calculated (mg/dL)   Date Value   01/17/2017 78   08/19/2014 74     LDL Cholesterol Direct (mg/dL)   Date Value   08/03/2017 89       Amount of exercise or physical activity: 5 days/week    Problems taking medications regularly: No    Medication side effects: none    Diet: regular (no restrictions) 1:1 ratio    Ran out of his insulin, so was buying regular insulin over the counter.  Evelin from [employer] is managing his pump settings.      Musculoskeletal problem/pain      Duration: chronic, intermittent     Description  Location: left second toe, ingrowing nail    Intensity:  moderate    Accompanying signs and symptoms: swelling and redness    History  Previous similar problem: YES  Previous evaluation:  none    Precipitating or alleviating factors:  Trauma or overuse: no   Aggravating factors include: none    Therapies tried and outcome: nothing    Hasn't seen podiatry for  several years, but has been dealing with chronic ingrowing nails.  He typically will try to remove them at home.     Problem list and histories reviewed & adjusted, as indicated.  Additional history: as documented    Patient Active Problem List   Diagnosis     Type 1 diabetes mellitus without complication (H)     FB (foreign body)     Hyperlipidemia LDL goal <100     No past surgical history on file.    Social History   Substance Use Topics     Smoking status: Passive Smoke Exposure - Never Smoker     Smokeless tobacco: Never Used      Comment: works at [employer]      Alcohol use No     Family History   Problem Relation Age of Onset     Asthma Father          Current Outpatient Prescriptions   Medication Sig Dispense Refill     glucagon (GLUCAGON EMERGENCY) 1 MG kit Inject 1 mg into the muscle once for 1 dose 1 mg 0     insulin aspart (NOVOLOG VIAL) 100 UNITS/ML injection INJECT SUB-Q 80 UNITS DAILY PER INSULIN PUMP AND SLIDING SCALE AS NEEDED 30 mL 3     aspirin 81 MG chewable tablet Take 1 tablet (81 mg) by mouth daily       INSULIN PUMP - OUTPATIENT Date last updated:  2/8/17  [employer]: Model 530G  BASAL RATES :  12   AM (midnight): 1.1 units/hour , 5AM  1.2, 10AM 2.0,  8PM 1.3  CARB RATIO:  12   AM (midnight): 10 grams  Corection Factor (Sensitivity):  12   AM (midnight): 40 mg/dL  BLOOD GLUCOSE TARGET:  12   AM (midnight): 90 - 100  1     AM:  100 - 120  10   AM:  90 - 100  Active Insulin Time:  4 hours  Sensor:  Yes: SENSOR GLUCOSE LIMITS:  12   AM (midnight): 70 - 300 1 each 0     blood glucose monitoring (NO BRAND SPECIFIED) meter device kit Use to test blood sugar 4 times daily or as directed. 1 kit 0     blood glucose monitoring (NO BRAND SPECIFIED) test strip Test 4 times daily 200 each 3     thin (NO BRAND SPECIFIED) lancets Test 4 times daily 200 each 3     STATIN NOT PRESCRIBED, INTENTIONAL, Statin not prescribed intentionally due to Not indicated based on age       loratadine (CLARITIN) 10 MG  "tablet Take 1 tablet (10 mg) by mouth daily 30 tablet 1     [DISCONTINUED] NOVOLOG VIAL 100 UNIT/ML soln INJECT SUB-Q 80 UNITS DAILY PER INSULIN PUMP AND SLIDING SCALE AS NEEDED 30 mL 0     [DISCONTINUED] INSULIN PUMP - OUTPATIENT Date last updated:  2/8/17  Medtronic Minimed: Model 530G  BASAL RATES :  12   AM (midnight): .9 units/hour , 5AM  1.2, 7AM 2.0,  12P 2.0,  10PM 1.3  CARB RATIO:  12   AM (midnight): 10 grams  Corection Factor (Sensitivity):  12   AM (midnight): 40 mg/dL  BLOOD GLUCOSE TARGET:  12   AM (midnight): 90 - 100  1     AM:  100 - 120  10   AM:  90 - 100  Active Insulin Time:  4 hours  Sensor:  Yes: SENSOR GLUCOSE LIMITS:  12   AM (midnight): 70 - 300 1 each 0     [DISCONTINUED] glucagon (GLUCAGON EMERGENCY) 1 MG injection Inject 1 mg into the muscle once for 1 dose 1 mg 0     [DISCONTINUED] ASPIRIN NOT PRESCRIBED, INTENTIONAL, continuous prn Antiplatelet medication not prescribed intentionally due to Intolerance (headache) 0 each 0     ACE NOT PRESCRIBED, INTENTIONAL, by Other route continuous prn.  0     No Known Allergies  BP Readings from Last 3 Encounters:   02/26/18 122/82   08/03/17 118/60   01/17/17 122/68    Wt Readings from Last 3 Encounters:   02/26/18 210 lb (95.3 kg)   08/03/17 202 lb 6.4 oz (91.8 kg)   01/17/17 204 lb 12.8 oz (92.9 kg)                    Reviewed and updated as needed this visit by clinical staff  Tobacco  Allergies  Soc Hx      Reviewed and updated as needed this visit by Provider         ROS:  Constitutional, HEENT, cardiovascular, pulmonary, gi and gu systems are negative, except as otherwise noted.    OBJECTIVE:     /82  Pulse 87  Temp 97.4  F (36.3  C) (Tympanic)  Resp 20  Ht 5' 11.5\" (1.816 m)  Wt 210 lb (95.3 kg)  SpO2 99%  BMI 28.88 kg/m2  Body mass index is 28.88 kg/(m^2).  GENERAL: healthy, alert and no distress  RESP: lungs clear to auscultation - no rales, rhonchi or wheezes  CV: regular rate and rhythm, normal S1 S2, no S3 or S4, no " murmur, click or rub, no peripheral edema and peripheral pulses strong  MS: no gross musculoskeletal defects noted, no edema  SKIN: no suspicious lesions or rashes, left second toe has part of the nail removed, there is erythema, swelling and pain to palpation, no drainage   Diabetic foot exam: normal DP and PT pulses, no trophic changes or ulcerative lesions except left toe as noted above, normal sensory exam and normal monofilament exam    Diagnostic Test Results:  Office Visit on 02/26/2018   Component Date Value Ref Range Status     Hemoglobin A1C 02/26/2018 8.8* 4.3 - 6.0 % Final           ASSESSMENT/PLAN:       1. Establishing care with new doctor, encounter for    2. Type 1 diabetes mellitus without complication (H)  Not at goal.  Get him back on his usual insulin, follow up with Evelin for pump setting changes.  Return for repeat A1C in 3 months.   - Hemoglobin A1c  - glucagon (GLUCAGON EMERGENCY) 1 MG kit; Inject 1 mg into the muscle once for 1 dose  Dispense: 1 mg; Refill: 0    3. Hyperlipidemia LDL goal <100  Chronic, controlled.  No change in treatment plan.     4. Ingrown nail  See podiatry.   - PODIATRY/FOOT & ANKLE SURGERY REFERRAL    FUTURE APPOINTMENTS:       - Follow-up visit in 3 months for fasting labs, diabetic recheck.   See Patient Instructions    SHARMIN Smith [facility]

## 2018-02-26 NOTE — MR AVS SNAPSHOT
After Visit Summary   2/26/2018    Kashif Rodriguez    MRN: [id number]           Patient Information     Date Of Birth          1990        Visit Information        Provider Department      2/26/2018 9:20 AM Coretta Obando APRN CNP [facility]        Today's Diagnoses     Type 1 diabetes mellitus without complication (H)    -  1    Type 1 diabetes mellitus without complication (H)        Ingrown nail          Care Instructions    See Dr. Nava, the podiatrist.     Come back in 3 months for labs.  Be fasting for this visit.               Follow-ups after your visit        Additional Services     PODIATRY/FOOT & ANKLE SURGERY REFERRAL       Your provider has referred you to: FM: [facility] [phone]   [email or web address]    Please be aware that coverage of these services is subject to the terms and limitations of your health insurance plan.  Call member services at your health plan with any benefit or coverage questions.      Please bring the following to your appointment:  >>   Any x-rays, CTs or MRIs which have been performed.  Contact the facility where they were done to arrange for  prior to your scheduled appointment.    >>   List of current medications   >>   This referral request   >>   Any documents/labs given to you for this referral                  Who to contact     If you have questions or need follow up information about today's clinic visit or your schedule please contact [facility] directly at [phone].  Normal or non-critical lab and imaging results will be communicated to you by MyChart, letter or phone within 4 business days after the clinic has received the results. If you do not hear from us within 7 days, please contact the clinic through MyChart or phone. If you have a critical or abnormal lab result, we will notify you by phone as soon as possible.  Submit refill requests through  "[employer] or call your pharmacy and they will forward the refill request to us. Please allow 3 business days for your refill to be completed.          Additional Information About Your Visit        MyChart Information     [employer] lets you send messages to your doctor, view your test results, renew your prescriptions, schedule appointments and more. To sign up, go to [email or web address] . Click on \"Log in\" on the left side of the screen, which will take you to the Welcome page. Then click on \"Sign up Now\" on the right side of the page.     You will be asked to enter the access code listed below, as well as some personal information. Please follow the directions to create your username and password.     Your access code is: [email or web address]  Expires: 2018  9:55 AM     Your access code will  in 90 days. If you need help or a new code, please call your [facility] clinic or [phone].        Care EveryWhere ID     This is your Care EveryWhere ID. This could be used by other organizations to access your [facility] medical records  [id number]        Your Vitals Were     Pulse Temperature Respirations Height Pulse Oximetry BMI (Body Mass Index)    87 97.4  F (36.3  C) (Tympanic) 20 5' 11.5\" (1.816 m) 99% 28.88 kg/m2       Blood Pressure from Last 3 Encounters:   18 122/82   17 118/60   17 122/68    Weight from Last 3 Encounters:   18 210 lb (95.3 kg)   17 202 lb 6.4 oz (91.8 kg)   17 204 lb 12.8 oz (92.9 kg)              We Performed the Following     Hemoglobin A1c     PODIATRY/FOOT & ANKLE SURGERY REFERRAL          Today's Medication Changes          These changes are accurate as of 18  9:55 AM.  If you have any questions, ask your nurse or doctor.               These medicines have changed or have updated prescriptions.        Dose/Directions    * ACE NOT PRESCRIBED (INTENTIONAL)   This may have changed:  Another medication with the same name was removed. Continue taking " this medication, and follow the directions you see here.   Used for:  Type 1 diabetes, HbA1c goal < 7% (H)   Changed by:  Coretta Obando APRN CNP        by Other route continuous prn.   Refills:  0       insulin aspart 100 UNITS/ML injection   Commonly known as:  NovoLOG VIAL   This may have changed:  See the new instructions.   Used for:  Type 1 diabetes mellitus without complication (H)   Changed by:  Coretta Obando APRN CNP        INJECT SUB-Q 80 UNITS DAILY PER INSULIN PUMP AND SLIDING SCALE AS NEEDED   Quantity:  30 mL   Refills:  3       * insulin pump infusion   This may have changed:  Another medication with the same name was removed. Continue taking this medication, and follow the directions you see here.   Used for:  Type 1 diabetes mellitus without complication (H)   Changed by:  Coretta Obando APRN CNP        Date last updated:  2/8/17 Medtronic Minimed: Model 530G BASAL RATES : 12   AM (midnight): .9 units/hour , 5AM  1.2, 7AM 2.0,  12P 2.0,  10PM 1.3 CARB RATIO: 12   AM (midnight): 10 grams Corection Factor (Sensitivity): 12   AM (midnight): 40 mg/dL BLOOD GLUCOSE TARGET: 12   AM (midnight): 90 - 100 1     AM:  100 - 120 10   AM:  90 - 100 Active Insulin Time:  4 hours Sensor:  Yes: SENSOR GLUCOSE LIMITS: 12   AM (midnight): 70 - 300   Quantity:  1 each   Refills:  0       * Notice:  This list has 2 medication(s) that are the same as other medications prescribed for you. Read the directions carefully, and ask your doctor or other care provider to review them with you.         Where to get your medicines      These medications were sent to [id number] - [place], [place] - [address] [place] [address]  [address] [place] [id number]    Hours:  M-F 8:30-6:30; Sat 9-4; closed Sunday Phone:  [phone]     glucagon 1 MG kit    insulin aspart 100 UNITS/ML injection                Primary Care Provider Office Phone # Fax #    SHARMIN Smith [phone]       [address]  [facility] [address]        Equal Access to Services     JAY STEPHEN : Hadii aad ku hadcesardeven Sopritiali, waaxda luqadaha, qaybta kaalmada bandar, young ugalde. So [facility] [phone].    ATENCIÓN: Si habla español, tiene a chakraborty disposición servicios gratuitos de asistencia lingüística. Llame al [phone].    We comply with applicable federal civil rights laws and [place] laws. We do not discriminate on the basis of race, color, national origin, age, disability, sex, sexual orientation, or gender identity.            Thank you!     Thank you for choosing [facility]  for your care. Our goal is always to provide you with excellent care. Hearing back from our patients is one way we can continue to improve our services. Please take a few minutes to complete the written survey that you may receive in the mail after your visit with us. Thank you!             Your Updated Medication List - Protect others around you: Learn how to safely use, store and throw away your medicines at www.disposemymeds.org.          This list is accurate as of 2/26/18  9:55 AM.  Always use your most recent med list.                   Brand Name Dispense Instructions for use Diagnosis    * ACE NOT PRESCRIBED (INTENTIONAL)      by Other route continuous prn.    Type 1 diabetes, HbA1c goal < 7% (H)       aspirin 81 MG chewable tablet      Take 1 tablet (81 mg) by mouth daily        blood glucose monitoring meter device kit    no brand specified    1 kit    Use to test blood sugar 4 times daily or as directed.    Type 1 diabetes, HbA1c goal < 7% (H)       blood glucose monitoring test strip    no brand specified    200 each    Test 4 times daily    Type 1 diabetes, HbA1c goal < 7% (H)       glucagon 1 MG kit    GLUCAGON EMERGENCY    1 mg    Inject 1 mg into the muscle once for 1 dose    Type 1 diabetes mellitus without complication (H)       insulin aspart 100 UNITS/ML injection    NovoLOG VIAL    30 mL     INJECT SUB-Q 80 UNITS DAILY PER INSULIN PUMP AND SLIDING SCALE AS NEEDED    Type 1 diabetes mellitus without complication (H)       * insulin pump infusion     1 each    Date last updated:  2/8/17 MedRegenaStem Minimed: Model 530G BASAL RATES : 12   AM (midnight): .9 units/hour , 5AM  1.2, 7AM 2.0,  12P 2.0,  10PM 1.3 CARB RATIO: 12   AM (midnight): 10 grams Corection Factor (Sensitivity): 12   AM (midnight): 40 mg/dL BLOOD GLUCOSE TARGET: 12   AM (midnight): 90 - 100 1     AM:  100 - 120 10   AM:  90 - 100 Active Insulin Time:  4 hours Sensor:  Yes: SENSOR GLUCOSE LIMITS: 12   AM (midnight): 70 - 300    Type 1 diabetes mellitus without complication (H)       loratadine 10 MG tablet    CLARITIN    30 tablet    Take 1 tablet (10 mg) by mouth daily    URI (upper respiratory infection)       STATIN NOT PRESCRIBED (INTENTIONAL)      Statin not prescribed intentionally due to Not indicated based on age        thin lancets    NO BRAND SPECIFIED    200 each    Test 4 times daily    Type 1 diabetes, HbA1c goal < 7% (H)       * Notice:  This list has 2 medication(s) that are the same as other medications prescribed for you. Read the directions carefully, and ask your doctor or other care provider to review them with you.

## 2018-02-26 NOTE — PATIENT INSTRUCTIONS
See Dr. Nava, the podiatrist.     Come back in 3 months for labs.  Be fasting for this visit.

## 2018-02-26 NOTE — NURSING NOTE
"Chief Complaint   Patient presents with     [facility]     Diabetes     Refill Request     Novolog       Initial /82  Pulse 87  Temp 97.4  F (36.3  C) (Tympanic)  Resp 20  Ht 5' 11.5\" (1.816 m)  Wt 210 lb (95.3 kg)  SpO2 99%  BMI 28.88 kg/m2 Estimated body mass index is 28.88 kg/(m^2) as calculated from the following:    Height as of this encounter: 5' 11.5\" (1.816 m).    Weight as of this encounter: 210 lb (95.3 kg).  Medication Reconciliation: complete   ................Lino Odom LPN,   February 26, 2018,      9:37 AM,   [facility]    "

## 2018-02-28 ENCOUNTER — TELEPHONE (OUTPATIENT)
Dept: PODIATRY | Facility: CLINIC | Age: 28
End: 2018-02-28

## 2018-02-28 ENCOUNTER — OFFICE VISIT (OUTPATIENT)
Dept: PODIATRY | Facility: OTHER | Age: 28
End: 2018-02-28
Payer: COMMERCIAL

## 2018-02-28 VITALS — BODY MASS INDEX: 28.44 KG/M2 | HEIGHT: 72 IN | TEMPERATURE: 97.7 F | WEIGHT: 210 LBS

## 2018-02-28 DIAGNOSIS — Z53.9 ERRONEOUS ENCOUNTER--DISREGARD: Primary | ICD-10-CM

## 2018-02-28 DIAGNOSIS — L60.0 INGROWING NAIL: Primary | ICD-10-CM

## 2018-02-28 PROCEDURE — 11730 AVULSION NAIL PLATE SIMPLE 1: CPT | Mod: T1 | Performed by: PODIATRIST

## 2018-02-28 PROCEDURE — 99243 OFF/OP CNSLTJ NEW/EST LOW 30: CPT | Mod: 25 | Performed by: PODIATRIST

## 2018-02-28 ASSESSMENT — PAIN SCALES - GENERAL: PAINLEVEL: NO PAIN (0)

## 2018-02-28 NOTE — PROGRESS NOTES
HPI:  Kashif Rodriguez is a 27 year old male who is seen in consultation at the request of Coretta Obando CNP    Pt presents for eval of:   (Onset, Location, L/R, Character, Treatments, Injury if yes)    DM Type 1     Ongoing for 2 years, Ingrown medial and lateral Left 2nd toenail worse early Feb 2018.  Redness, swelling, drainage, pain w/pressure    Works  at [employer] and walking for 8 hour work days.    Weight management plan: Patient was referred to their PCP to discuss a diet and exercise plan.     Review of Systems:  Patient denies fever, chills, rash, wound, stiffness, limping, numbness, weakness, heart burn, blood in stool, chest pain with activity, calf pain when walking, shortness of breath with activity, chronic cough, easy bleeding/bruising, swelling of ankles, excessive thirst, fatigue, depression, anxiety.  Pt admits to the symptoms noted in history above.     PAST MEDICAL HISTORY:   Past Medical History:   Diagnosis Date     Hyperlipidemia LDL goal <100 1/17/2017        PAST SURGICAL HISTORY: History reviewed. No pertinent surgical history.     MEDICATIONS:   Current Outpatient Prescriptions:      insulin aspart (NOVOLOG VIAL) 100 UNITS/ML injection, INJECT SUB-Q 80 UNITS DAILY PER INSULIN PUMP AND SLIDING SCALE AS NEEDED, Disp: 30 mL, Rfl: 3     aspirin 81 MG chewable tablet, Take 1 tablet (81 mg) by mouth daily, Disp: , Rfl:      INSULIN PUMP - OUTPATIENT, Date last updated:  2/8/17 MedAXS-One Minimed: Model 530G BASAL RATES : 12   AM (midnight): 1.1 units/hour , 5AM  1.2, 10AM 2.0,  8PM 1.3 CARB RATIO: 12   AM (midnight): 10 grams Corection Factor (Sensitivity): 12   AM (midnight): 40 mg/dL BLOOD GLUCOSE TARGET: 12   AM (midnight): 90 - 100 1     AM:  100 - 120 10   AM:  90 - 100 Active Insulin Time:  4 hours Sensor:  Yes: SENSOR GLUCOSE LIMITS: 12   AM (midnight): 70 - 300, Disp: 1 each, Rfl: 0     blood glucose monitoring (NO BRAND SPECIFIED) meter device kit,  "Use to test blood sugar 4 times daily or as directed., Disp: 1 kit, Rfl: 0     blood glucose monitoring (NO BRAND SPECIFIED) test strip, Test 4 times daily, Disp: 200 each, Rfl: 3     thin (NO BRAND SPECIFIED) lancets, Test 4 times daily, Disp: 200 each, Rfl: 3     STATIN NOT PRESCRIBED, INTENTIONAL,, Statin not prescribed intentionally due to Not indicated based on age, Disp: , Rfl:      loratadine (CLARITIN) 10 MG tablet, Take 1 tablet (10 mg) by mouth daily, Disp: 30 tablet, Rfl: 1     ACE NOT PRESCRIBED, INTENTIONAL,, by Other route continuous prn., Disp: , Rfl: 0     glucagon (GLUCAGON EMERGENCY) 1 MG kit, Inject 1 mg into the muscle once for 1 dose, Disp: 1 mg, Rfl: 0     ALLERGIES:  No Known Allergies     SOCIAL HISTORY:   Social History     Social History     Marital status:      Spouse name: N/A     Number of children: N/A     Years of education: N/A     Occupational History     Not on file.     Social History Main Topics     Smoking status: Passive Smoke Exposure - Never Smoker     Smokeless tobacco: Never Used      Comment: works at [employer]      Alcohol use No     Drug use: No     Sexual activity: Yes     Other Topics Concern     Parent/Sibling W/ Cabg, Mi Or Angioplasty Before 65f 55m? No     Social History Narrative        FAMILY HISTORY:   Family History   Problem Relation Age of Onset     Asthma Father         EXAM:Vitals: Temp 97.7  F (36.5  C) (Temporal)  Ht 5' 11.5\" (1.816 m)  Wt 210 lb (95.3 kg)  BMI 28.88 kg/m2  BMI= Body mass index is 28.88 kg/(m^2).    General appearance: Patient is alert and fully cooperative with history & exam.  No sign of distress is noted during the visit.     Psychiatric: Affect is pleasant & appropriate.  Patient appears motivated to improve health.     Respiratory: Breathing is regular & unlabored while sitting.     HEENT: Hearing is intact to spoken word.  Speech is clear.  No gross evidence of visual impairment that would impact ambulation.     Vascular: DP & " PT pulses are intact & regular, CFT immediate, positive digital hair growth bilaterally.  No significant edema or varicosities noted and skin temperature is normal to both lower extremities.     Neurologic: Lower extremity sensation is intact to light touch.  No evidence of weakness or contracture in the lower extremities.  No evidence of neuropathy.    Dermatologic: Adequate texture, turgor and tone about the integument.  No discoloration or thickening of the toenail however the left lateral second toenail is severely ingrown with an abscess.  Upon avulsion of the lateral border about 1 mL of purulent drainage is noted.  This does not clearly probe to bone or tendon.  Erythema and abscess is localized to the eponychium.      Musculoskeletal: Patient is ambulatory without assistive device or brace.  No gross ankle deformity noted.  No foot or ankle joint effusion is noted.      Hemoglobin A1C (%)   Date Value   02/26/2018 8.8 (H)   08/03/2017 8.6 (H)   01/17/2017 9.4 (H)   09/11/2015 7.9 (H)   02/20/2015 8.7 (H)   08/19/2014 9.0 (H)   05/20/2014 9.6 (H)   11/15/2013 9.2 (H)     Creatinine (mg/dL)   Date Value   08/03/2017 0.96   01/17/2017 0.89   08/19/2014 0.79   12/06/2012 0.78   11/10/2011 0.82   02/25/2011 0.80       ASSESSMENT:       ICD-10-CM    1. Ingrowing nail L60.0 REMOVAL OF NAIL PLATE SIMPLE SINGLE   2. 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  Due to uncontrolled diabetes I would not recommend permanent matrixectomy was slightly higher risk of osteomyelitis delayed wound healing and infection with current infection.  Therefore recommended avulsion.  I obtained informed consent prepped with alcohol injected 2 mL's of lidocaine to achieve local anesthesia prepped the toe with Betadine and avulsed the lateral left second toenail.  About 1 mL of purulent  drainage was exsanguinated from the nail border.  A nonadherent dressing was applied with compression.  He was given written postoperative instructions.  This will regrow starting in 3 months and may take 6-9 months to regrow full-length and if this becomes ingrown again would recommend permanent matrixectomy.  This should be done before abscess noted.    In addition to the above procedure approximately 30 minutes of times spent discussing treatment options as well as risk factors associated with his lower extremities and uncontrolled diabetes.  He is quite engaged in his care and outcome and would like to improve his glucose management to reduce lifetime risk of complication associated with diabetes.  He has recent changes in his insulin pump and glucose monitor.    I did not discuss with him a care coordination consult or nutrition consult but will place him on the uncontrolled diabetes A1C list as I suspect he would be motivated to improve his outcome with further follow-up possibly with diabetes education, or further education and management or modification of his sliding scale, insulin pump, monitor.      Harman Nava DPM

## 2018-02-28 NOTE — PATIENT INSTRUCTIONS
[facility] TOENAIL POSTOPERATIVE INSTRUCTIONS   (Nail avulsion or chemical matrixectomy)   1.  Go directly home and elevate the affected foot on one or two pillows for the remainder of the day & evening. Your toe may stay numb for 2-8 hours.   2.  Take Tylenol, ibuprofen or another anti-inflammatory as needed for pain. Most people require no pain medication.  3.  Use oral antibiotic if that was prescribed at your doctor visit. Take the entire prescription even if your symptoms have improved.   4.  Keep dressing dry and intact the day of the procedure. The morning after the procedure, remove entire dressing and soak or wash the affected area in lukewarm water for 5-10 minutes.  You may add Epsom salt to soothe the area and help it become drier. Do this twice a day or more until the surgical site remains dry without drainage.  This may take 1-2 weeks if a small part of your nail was removed or 4-8 weeks if the entire nail was removed. You may count showering or bathing as one soak.  After each soak, pat the area dry with a clean towel or gauze and then allow to air dry for a few minutes. Then cover with a cloth or fabric bandaid.  Avoid ointments as they keep the area to wet. Encourage this wound to become dry with a scab.   5.  You may walk and pursue everyday activities as tolerated with either an open toe shoe or cut-out shoe as needed. You may wear regular roomy shoes if no pain is noted.  No swimming in the lake, river, pool or hot tub until the wound has become dry.   7.  Watch for any signs or symptoms of infection such as: red streaks going up the foot/leg, swelling, pus or foul odor. For patients that have had a permanent phenol procedure, the toe will drain longer and may look red or sore for a half an inch around the wound similar to infection because it is a chemical burn.  Please call with questions.  8.  You may call my office in 1-2 weeks if the surgical site is not becoming drier or if other complications  occur.   9.  There is 5-10% chance of complications such as infection or formation of another nail or a thick scared nail.      DIABETES AND YOUR FEET    What effect does diabetes have on the feet?  Diabetes can result in several problems in the feet including contractures of the tendons leading to deformities and reduced function of the bones, skin ulcers or open sores on pressure points or prominent deformities, reduced sensation, reduced blood flow and thus reduced oxygen and immune cells to the tiny vessels in our feet. This all leads to higher risk of hospitalization, infections, and amputations.     What is neuropathy?  Neuropathy is a term used to describe a loss of nerve function.  Patients with diabetes are at risk of developing neuropathy if their sugars continue to run high and are above the normal value of 140.  The elevated blood sugar in the body enters the nerves causing it to swell and impair nerve function.  The higher the blood sugar and the longer it is elevated, the more damage is done to nerves.  This damage is permanent and irreversible.  These damaged sensory nerves can then cause reduced feeling or cause pain.  Damaged motor nerves can reduce blood flow and white blood cells into into your foot, skin and bones reducing your ability to heal a small problem. And neuropathy can cause tendons to become unbalanced and contribute to the formation of deformity and contractures in our feet. Often times, neuropathy can be prevented by controlling your blood sugar.  Your risk of developing neuropathy goes up dramatically as your hemoglobin A1C raises above 7.5.      How do I know if I have neuropathy?  When a person develops neuropathy, they usually begin to feel numbness or tingling in their feet and sometimes in their legs.  Other symptoms may include painful burning or hot feet, tingling, electrical sensations or feeling like insects or ants are crawling on your feet or legs.  If blood sugar remains  "above 140  for long periods of time, neuropathy can also occur in the hands.  When a person loses their \"protective threshold\" or ability to detect a 5.07 [place] monofilament is when they have elevated risk for developing foot deformity, contractures, foot infections, amputations, Charcot arthropathy, or other complications. Keep your hemoglobin A1C below 7.5 to reduce this risk.    What is vascular disease?  Peripheral vascular disease is a term used to describe a loss or decrease in circulation (blood flow).  There is a problem in getting blood, immune cells, and oxygen to areas that need it.  Similar to neuropathy, sugars can build up in the walls of the arteries (blood vessels) and cause them to become swollen, thickened and hardened.  This decreases the amount of blood that can go to an area that needs it.  Though this is common in the legs of diabetic patients, it can also affect other arteries (blood vessels) in the body such as in the heart, kidney, eyes, and the blood flow into bones.  It is often seen first in the small vessels of her body notably our feet and toes.    How do I know if I have vascular disease?  In the legs, vascular disease usually results in cramping.  Patients who develop leg cramps after walking the same distance every time (i.e. One block, half a mile, ect.) need to let their doctors know so that their circulation may be checked.  Cramps causing severe pain in the feet and/or legs while sleeping and the cramps go away when you stand or hang your legs off the side of the bed, may also be a sign of poor blood circulation.  Occasional cramping in cold weather or on rare occasions with activity may not be due to poor circulation, but you should inform your doctor.    How can these problems be prevented?  The key to prevention is good blood sugar control all day every day.  Inadequate blood sugar control is the most common way patients experience these problems. Reducing, " controlling and measuring your daily consumption of sugar or carbohydrates is essential to understanding and managing diabetes.  Physical activity (exercise) is a very good way to help decrease your blood sugars.  Exercise can lower your blood sugar, blood pressure, and cholesterol.  It also reduces your risk for heart disease and stroke, relieves stress, and strengthens your heart, muscles and bones. Physical activity also increases your balance and reduces development of contractures and foot deformities over time. In addition, regular activity helps insulin work better, improves your blood circulation, and keeps your joints flexible.  If you're trying to lose weight, a combination of exercise and wise food choices can help you reach your target weight and maintain it.  Activity and exercise alone can not make up for poor diet choices, eating too much, or eating too many sugars or carbohydrates.  Ask your doctor for help when you are not meeting your blood sugar goals. Changes or increases in medication are powerful tools in reducing your blood sugar.    Know your blood sugar and hemoglobin A1C trend.  Upon first diagnosis or during acute illness, checking your blood sugar 4 times a day can help you understand how your diet, activity, and lifestyle affect your blood sugar.  Monitoring your hemoglobin A1C can help you understand how well you are managing blood sugar over the long run.  Your hemoglobin A1C tells you what your blood sugar averages all day, every day, over the past 90 days.       To experience the lowest risk of complications associated with diabetes such as neuropathy, loss of blood flow, bone or joint infection, charcot arthropathy, or amputation, the American Diabetes Association recommends a target hemoglobin A1C of less than 7.0%, while the American Association of Clinical Endocrinologists' recommendation is 6.5% or less.  Both organizations advise that the goals be individualized based on  patient factors such as other health conditions, history of hypoglycemia, education, and life expectancy.  A patients risk of experiencing complications associated with diabetes is only slightly elevated with a hemoglobin A1C above 6.0.  However, this risk goes up exponentially when the hemoglobin A1C is above 7.5.  The longer the hemoglobin A1C is elevated, the more risk that patient will experience in their lifetime. The damage that occurs to nerves, blood vessels, tendons, bones and body organs, while their hemoglobin A1C is elevated is mostly irreversible and worsens with each additional time period of elevated hemoglobin A1C.     You must understand and manage your disease.  Your health insurance or medical team cannot manage this disease for you.  When you take responsibility for understanding and managing your disease, you can expect to experience fewer problems associated with diabetes in your lifetime.  You will  Also experience a higher quality of life and health and reduced cost of health care.    Diabetic Foot Care Recommendations  The following are recommendations for avoiding serious foot problems or injury    DO'S  1. Be aware of your hemoglobin A1C and continue to follow up with your medical team for adjustments in your lifestyle and medication until your reach your A1C goal.  Keep this below 7.5 to reduce your risk of developing complications associated with diabetes.    2.  Wash your feet with lukewarm water and a mild soap and then dry them thoroughly, especially between the toes.  Gently floss your towel or washcloth between each toe at every bath.  Soaking your feet in water cannot clean dead skin, debris, and bacteria from your feet and is not necessary.   3. Examine your feet daily looking for cuts, corns, blisters, cracks, ect..., especially after wearing new shoes or increased or changed activities.  Make sure to look between your toes.  If you cannot see the bottom of your feet, set a  mirror on the floor and hold your foot over it, or ask a family member to examine your feet for you daily.  Contact your doctor immediately if new problems are noted or if sores are not healing.  4.  Immediately apply moisturizer cream such as Cetaphil to the tops and bottoms of your feet, avoiding areas between the toes.  Apply sunscreen or cover your feet if they will be exposed to extended sunlight.  5.Use clean comfortable shoes.  Socks should not have thick seams or cut off the circulation around the leg.  Break in new shoes slowly and rotate with older shoes until broken in.  Check the inside of your shoes with your hand to look for areas of irritation or objects that may have fallen into your shoes.    6. Keep slippers by the side of your bed for use during the night.  7. Shoes should be fitted by a professional and should not cause areas of irritation.  Check your feet regularly when wearing a new pair of shoes and replace them as needed.  8.  Talk to your doctor about proper exercise.  Exercise and stretching stimulate blood flow to your feet and maintain proper glucose levels.  Use it or lose it!  9.  Monitor your blood glucose level and your hemoglobin A1C.  Notify your doctor immediately if your blood sugar is abnormally high or low.  10.  Cut your nails straight across, but then gently round any sharp edges with a nail file.  If you have neuropathy, peripheral vascular disease or cannot see that well to trim your own toenails, see a medical professional for care.    DONT'S  1.  Do not soak your feet if you have an open sore or your provider has informed you that you have neuropathy or loss of protective threshold.  Use only lukewarm water and always check the temperature with your hand as hot water can easily burn your feet.    2.  Never use a hot water bottle or heating pad on your feet.  Also do not apply hot or cold compresses to your feet.  With decreased sensation, you could burn or freeze your  feet.  Do not rest your feet near a heat source such as a heater or heat register.    3.  Do not apply any of these to your feet:    - over the counter medicine for corns or warts    -  Harsh chemicals like boric acid    -  Do not self-treat corns, cuts, blisters or infections.  Always consult your doctor.   4.  Do not wear sandals, slippers or walk barefoot, especially on harsh surfaces.  5.  If you smoke, stop!!!

## 2018-02-28 NOTE — MR AVS SNAPSHOT
After Visit Summary   2/28/2018    Kashif Rodriguez    MRN: [id number]           Patient Information     Date Of Birth          1990        Visit Information        Provider Department      2/28/2018 9:30 AM Harman Nava DPM [facility]        Care Instructions    [place]ROWN TOENAIL POSTOPERATIVE INSTRUCTIONS   (Nail avulsion or chemical matrixectomy)   1.  Go directly home and elevate the affected foot on one or two pillows for the remainder of the day & evening. Your toe may stay numb for 2-8 hours.   2.  Take Tylenol, ibuprofen or another anti-inflammatory as needed for pain. Most people require no pain medication.  3.  Use oral antibiotic if that was prescribed at your doctor visit. Take the entire prescription even if your symptoms have improved.   4.  Keep dressing dry and intact the day of the procedure. The morning after the procedure, remove entire dressing and soak or wash the affected area in lukewarm water for 5-10 minutes.  You may add Epsom salt to soothe the area and help it become drier. Do this twice a day or more until the surgical site remains dry without drainage.  This may take 1-2 weeks if a small part of your nail was removed or 4-8 weeks if the entire nail was removed. You may count showering or bathing as one soak.  After each soak, pat the area dry with a clean towel or gauze and then allow to air dry for a few minutes. Then cover with a cloth or fabric bandaid.  Avoid ointments as they keep the area to wet. Encourage this wound to become dry with a scab.   5.  You may walk and pursue everyday activities as tolerated with either an open toe shoe or cut-out shoe as needed. You may wear regular roomy shoes if no pain is noted.  No swimming in the lake, river, pool or hot tub until the wound has become dry.   7.  Watch for any signs or symptoms of infection such as: red streaks going up the foot/leg, swelling, pus or foul odor. For patients that have  had a permanent phenol procedure, the toe will drain longer and may look red or sore for a half an inch around the wound similar to infection because it is a chemical burn.  Please call with questions.  8.  You may call my office in 1-2 weeks if the surgical site is not becoming drier or if other complications occur.   9.  There is 5-10% chance of complications such as infection or formation of another nail or a thick scared nail.      DIABETES AND YOUR FEET    What effect does diabetes have on the feet?  Diabetes can result in several problems in the feet including contractures of the tendons leading to deformities and reduced function of the bones, skin ulcers or open sores on pressure points or prominent deformities, reduced sensation, reduced blood flow and thus reduced oxygen and immune cells to the tiny vessels in our feet. This all leads to higher risk of hospitalization, infections, and amputations.     What is neuropathy?  Neuropathy is a term used to describe a loss of nerve function.  Patients with diabetes are at risk of developing neuropathy if their sugars continue to run high and are above the normal value of 140.  The elevated blood sugar in the body enters the nerves causing it to swell and impair nerve function.  The higher the blood sugar and the longer it is elevated, the more damage is done to nerves.  This damage is permanent and irreversible.  These damaged sensory nerves can then cause reduced feeling or cause pain.  Damaged motor nerves can reduce blood flow and white blood cells into into your foot, skin and bones reducing your ability to heal a small problem. And neuropathy can cause tendons to become unbalanced and contribute to the formation of deformity and contractures in our feet. Often times, neuropathy can be prevented by controlling your blood sugar.  Your risk of developing neuropathy goes up dramatically as your hemoglobin A1C raises above 7.5.      How do I know if I have  "neuropathy?  When a person develops neuropathy, they usually begin to feel numbness or tingling in their feet and sometimes in their legs.  Other symptoms may include painful burning or hot feet, tingling, electrical sensations or feeling like insects or ants are crawling on your feet or legs.  If blood sugar remains above 140  for long periods of time, neuropathy can also occur in the hands.  When a person loses their \"protective threshold\" or ability to detect a 5.07 Alpine Roselia monofilament is when they have elevated risk for developing foot deformity, contractures, foot infections, amputations, Charcot arthropathy, or other complications. Keep your hemoglobin A1C below 7.5 to reduce this risk.    What is vascular disease?  Peripheral vascular disease is a term used to describe a loss or decrease in circulation (blood flow).  There is a problem in getting blood, immune cells, and oxygen to areas that need it.  Similar to neuropathy, sugars can build up in the walls of the arteries (blood vessels) and cause them to become swollen, thickened and hardened.  This decreases the amount of blood that can go to an area that needs it.  Though this is common in the legs of diabetic patients, it can also affect other arteries (blood vessels) in the body such as in the heart, kidney, eyes, and the blood flow into bones.  It is often seen first in the small vessels of her body notably our feet and toes.    How do I know if I have vascular disease?  In the legs, vascular disease usually results in cramping.  Patients who develop leg cramps after walking the same distance every time (i.e. One block, half a mile, ect.) need to let their doctors know so that their circulation may be checked.  Cramps causing severe pain in the feet and/or legs while sleeping and the cramps go away when you stand or hang your legs off the side of the bed, may also be a sign of poor blood circulation.  Occasional cramping in cold weather or on " rare occasions with activity may not be due to poor circulation, but you should inform your doctor.    How can these problems be prevented?  The key to prevention is good blood sugar control all day every day.  Inadequate blood sugar control is the most common way patients experience these problems. Reducing, controlling and measuring your daily consumption of sugar or carbohydrates is essential to understanding and managing diabetes.  Physical activity (exercise) is a very good way to help decrease your blood sugars.  Exercise can lower your blood sugar, blood pressure, and cholesterol.  It also reduces your risk for heart disease and stroke, relieves stress, and strengthens your heart, muscles and bones. Physical activity also increases your balance and reduces development of contractures and foot deformities over time. In addition, regular activity helps insulin work better, improves your blood circulation, and keeps your joints flexible.  If you're trying to lose weight, a combination of exercise and wise food choices can help you reach your target weight and maintain it.  Activity and exercise alone can not make up for poor diet choices, eating too much, or eating too many sugars or carbohydrates.  Ask your doctor for help when you are not meeting your blood sugar goals. Changes or increases in medication are powerful tools in reducing your blood sugar.    Know your blood sugar and hemoglobin A1C trend.  Upon first diagnosis or during acute illness, checking your blood sugar 4 times a day can help you understand how your diet, activity, and lifestyle affect your blood sugar.  Monitoring your hemoglobin A1C can help you understand how well you are managing blood sugar over the long run.  Your hemoglobin A1C tells you what your blood sugar averages all day, every day, over the past 90 days.       To experience the lowest risk of complications associated with diabetes such as neuropathy, loss of blood flow, bone  or joint infection, charcot arthropathy, or amputation, the American Diabetes Association recommends a target hemoglobin A1C of less than 7.0%, while the American Association of Clinical Endocrinologists' recommendation is 6.5% or less.  Both organizations advise that the goals be individualized based on patient factors such as other health conditions, history of hypoglycemia, education, and life expectancy.  A patients risk of experiencing complications associated with diabetes is only slightly elevated with a hemoglobin A1C above 6.0.  However, this risk goes up exponentially when the hemoglobin A1C is above 7.5.  The longer the hemoglobin A1C is elevated, the more risk that patient will experience in their lifetime. The damage that occurs to nerves, blood vessels, tendons, bones and body organs, while their hemoglobin A1C is elevated is mostly irreversible and worsens with each additional time period of elevated hemoglobin A1C.     You must understand and manage your disease.  Your health insurance or medical team cannot manage this disease for you.  When you take responsibility for understanding and managing your disease, you can expect to experience fewer problems associated with diabetes in your lifetime.  You will  Also experience a higher quality of life and health and reduced cost of health care.    Diabetic Foot Care Recommendations  The following are recommendations for avoiding serious foot problems or injury    DO'S  1. Be aware of your hemoglobin A1C and continue to follow up with your medical team for adjustments in your lifestyle and medication until your reach your A1C goal.  Keep this below 7.5 to reduce your risk of developing complications associated with diabetes.    2.  Wash your feet with lukewarm water and a mild soap and then dry them thoroughly, especially between the toes.  Gently floss your towel or washcloth between each toe at every bath.  Soaking your feet in water cannot clean dead  skin, debris, and bacteria from your feet and is not necessary.   3. Examine your feet daily looking for cuts, corns, blisters, cracks, ect..., especially after wearing new shoes or increased or changed activities.  Make sure to look between your toes.  If you cannot see the bottom of your feet, set a mirror on the floor and hold your foot over it, or ask a family member to examine your feet for you daily.  Contact your doctor immediately if new problems are noted or if sores are not healing.  4.  Immediately apply moisturizer cream such as Cetaphil to the tops and bottoms of your feet, avoiding areas between the toes.  Apply sunscreen or cover your feet if they will be exposed to extended sunlight.  5.Use clean comfortable shoes.  Socks should not have thick seams or cut off the circulation around the leg.  Break in new shoes slowly and rotate with older shoes until broken in.  Check the inside of your shoes with your hand to look for areas of irritation or objects that may have fallen into your shoes.    6. Keep slippers by the side of your bed for use during the night.  7. Shoes should be fitted by a professional and should not cause areas of irritation.  Check your feet regularly when wearing a new pair of shoes and replace them as needed.  8.  Talk to your doctor about proper exercise.  Exercise and stretching stimulate blood flow to your feet and maintain proper glucose levels.  Use it or lose it!  9.  Monitor your blood glucose level and your hemoglobin A1C.  Notify your doctor immediately if your blood sugar is abnormally high or low.  10.  Cut your nails straight across, but then gently round any sharp edges with a nail file.  If you have neuropathy, peripheral vascular disease or cannot see that well to trim your own toenails, see a medical professional for care.    DONT'S  1.  Do not soak your feet if you have an open sore or your provider has informed you that you have neuropathy or loss of protective  "threshold.  Use only lukewarm water and always check the temperature with your hand as hot water can easily burn your feet.    2.  Never use a hot water bottle or heating pad on your feet.  Also do not apply hot or cold compresses to your feet.  With decreased sensation, you could burn or freeze your feet.  Do not rest your feet near a heat source such as a heater or heat register.    3.  Do not apply any of these to your feet:    - over the counter medicine for corns or warts    -  Harsh chemicals like boric acid    -  Do not self-treat corns, cuts, blisters or infections.  Always consult your doctor.   4.  Do not wear sandals, slippers or walk barefoot, especially on harsh surfaces.  5.  If you smoke, stop!!!          Follow-ups after your visit        Who to contact     If you have questions or need follow up information about today's clinic visit or your schedule please contact [facility] directly at [phone].  Normal or non-critical lab and imaging results will be communicated to you by [employer], letter or phone within 4 business days after the clinic has received the results. If you do not hear from us within 7 days, please contact the clinic through [employer] or phone. If you have a critical or abnormal lab result, we will notify you by phone as soon as possible.  Submit refill requests through [employer] or call your pharmacy and they will forward the refill request to us. Please allow 3 business days for your refill to be completed.          Additional Information About Your Visit        [employer] Information     [employer] lets you send messages to your doctor, view your test results, renew your prescriptions, schedule appointments and more. To sign up, go to [email or web address] . Click on \"Log in\" on the left side of the screen, which will take you to the Welcome page. Then click on \"Sign up Now\" on the right side of the page.     You will be asked to enter the access code listed below, as well " "as some personal information. Please follow the directions to create your username and password.     Your access code is: [email or web address]  Expires: 2018  9:55 AM     Your access code will  in 90 days. If you need help or a new code, please call your [place] clinic or [phone].        Care EveryWhere ID     This is your Care EveryWhere ID. This could be used by other organizations to access your [place] medical records  [id number]        Your Vitals Were     Temperature Height BMI (Body Mass Index)             97.7  F (36.5  C) (Temporal) 5' 11.5\" (1.816 m) 28.88 kg/m2          Blood Pressure from Last 3 Encounters:   18 122/82   17 118/60   17 122/68    Weight from Last 3 Encounters:   18 210 lb (95.3 kg)   18 210 lb (95.3 kg)   17 202 lb 6.4 oz (91.8 kg)              Today, you had the following     No orders found for display       Primary Care Provider Office Phone # Fax #    SHARMIN Smith [phone]       [address] [facility] [address]        Equal Access to Services     JAY STEPHEN : Hadii aad ku hadasho Soomaali, waaxda luqadaha, qaybta kaalmada adeegyada, waxcarlos ugalde. So [facility] [phone].    ATENCIÓN: Si habla español, tiene a chakraborty disposición servicios gratuitos de asistencia lingüística. Llame al [phone].    We comply with applicable federal civil rights laws and [place] laws. We do not discriminate on the basis of race, color, national origin, age, disability, sex, sexual orientation, or gender identity.            Thank you!     Thank you for choosing [facility]  for your care. Our goal is always to provide you with excellent care. Hearing back from our patients is one way we can continue to improve our services. Please take a few minutes to complete the written survey that you may receive in the mail after your visit with us. Thank you!             Your Updated Medication " List - Protect others around you: Learn how to safely use, store and throw away your medicines at www.disposemymeds.org.          This list is accurate as of 2/28/18 10:42 AM.  Always use your most recent med list.                   Brand Name Dispense Instructions for use Diagnosis    * ACE NOT PRESCRIBED (INTENTIONAL)      by Other route continuous prn.    Type 1 diabetes, HbA1c goal < 7% (H)       aspirin 81 MG chewable tablet      Take 1 tablet (81 mg) by mouth daily        blood glucose monitoring meter device kit    no brand specified    1 kit    Use to test blood sugar 4 times daily or as directed.    Type 1 diabetes, HbA1c goal < 7% (H)       blood glucose monitoring test strip    no brand specified    200 each    Test 4 times daily    Type 1 diabetes, HbA1c goal < 7% (H)       glucagon 1 MG kit    GLUCAGON EMERGENCY    1 mg    Inject 1 mg into the muscle once for 1 dose    Type 1 diabetes mellitus without complication (H)       insulin aspart 100 UNITS/ML injection    NovoLOG VIAL    30 mL    INJECT SUB-Q 80 UNITS DAILY PER INSULIN PUMP AND SLIDING SCALE AS NEEDED        * insulin pump infusion     1 each    Date last updated:  2/8/17 Medtronic Minimed: Model 530G BASAL RATES : 12   AM (midnight): 1.1 units/hour , 5AM  1.2, 10AM 2.0,  8PM 1.3 CARB RATIO: 12   AM (midnight): 10 grams Corection Factor (Sensitivity): 12   AM (midnight): 40 mg/dL BLOOD GLUCOSE TARGET: 12   AM (midnight): 90 - 100 1     AM:  100 - 120 10   AM:  90 - 100 Active Insulin Time:  4 hours Sensor:  Yes: SENSOR GLUCOSE LIMITS: 12   AM (midnight): 70 - 300        loratadine 10 MG tablet    CLARITIN    30 tablet    Take 1 tablet (10 mg) by mouth daily    URI (upper respiratory infection)       STATIN NOT PRESCRIBED (INTENTIONAL)      Statin not prescribed intentionally due to Not indicated based on age        thin lancets    NO BRAND SPECIFIED    200 each    Test 4 times daily    Type 1 diabetes, HbA1c goal < 7% (H)       * Notice:  This  list has 2 medication(s) that are the same as other medications prescribed for you. Read the directions carefully, and ask your doctor or other care provider to review them with you.

## 2018-02-28 NOTE — NURSING NOTE
"Chief Complaint   Patient presents with     Consult     Ingrown Left 2nd toenail; new pt     Diabetes     Type 1       Initial Temp 97.7  F (36.5  C) (Temporal)  Ht 5' 11.5\" (1.816 m)  Wt 210 lb (95.3 kg)  BMI 28.88 kg/m2 Estimated body mass index is 28.88 kg/(m^2) as calculated from the following:    Height as of this encounter: 5' 11.5\" (1.816 m).    Weight as of this encounter: 210 lb (95.3 kg).  BP completed using cuff size: NA (Not Taken)  Medication Reconciliation: complete    Soo Rust CMA, February 28, 2018    "

## 2018-02-28 NOTE — LETTER
2/28/2018         RE: Kashif Rodriguez  [address]  [facility] [address]        Dear Colleague,    Thank you for referring your patient, Kashif Rodriguez, to the [facility]. Please see a copy of my visit note below.    HPI:  Kashif Rodriguez is a 27 year old male who is seen in consultation at the request of Coretta Obando CNP    Pt presents for eval of:   (Onset, Location, L/R, Character, Treatments, Injury if yes)    DM Type 1     Ongoing for 2 years, Ingrown medial and lateral Left 2nd toenail worse early Feb 2018.  Redness, swelling, drainage, pain w/pressure    Works  at [employer] and walking for 8 hour work days.    Weight management plan: Patient was referred to their PCP to discuss a diet and exercise plan.     Review of Systems:  Patient denies fever, chills, rash, wound, stiffness, limping, numbness, weakness, heart burn, blood in stool, chest pain with activity, calf pain when walking, shortness of breath with activity, chronic cough, easy bleeding/bruising, swelling of ankles, excessive thirst, fatigue, depression, anxiety.  Pt admits to the symptoms noted in history above.     PAST MEDICAL HISTORY:   Past Medical History:   Diagnosis Date     Hyperlipidemia LDL goal <100 1/17/2017        PAST SURGICAL HISTORY: History reviewed. No pertinent surgical history.     MEDICATIONS:   Current Outpatient Prescriptions:      insulin aspart (NOVOLOG VIAL) 100 UNITS/ML injection, INJECT SUB-Q 80 UNITS DAILY PER INSULIN PUMP AND SLIDING SCALE AS NEEDED, Disp: 30 mL, Rfl: 3     aspirin 81 MG chewable tablet, Take 1 tablet (81 mg) by mouth daily, Disp: , Rfl:      INSULIN PUMP - OUTPATIENT, Date last updated:  2/8/17 Medtronic Minimed: Model 530G BASAL RATES : 12   AM (midnight): 1.1 units/hour , 5AM  1.2, 10AM 2.0,  8PM 1.3 CARB RATIO: 12   AM (midnight): 10 grams Corection Factor (Sensitivity): 12   AM (midnight): 40 mg/dL BLOOD GLUCOSE TARGET: 12    "AM (midnight): 90 - 100 1     AM:  100 - 120 10   AM:  90 - 100 Active Insulin Time:  4 hours Sensor:  Yes: SENSOR GLUCOSE LIMITS: 12   AM (midnight): 70 - 300, Disp: 1 each, Rfl: 0     blood glucose monitoring (NO BRAND SPECIFIED) meter device kit, Use to test blood sugar 4 times daily or as directed., Disp: 1 kit, Rfl: 0     blood glucose monitoring (NO BRAND SPECIFIED) test strip, Test 4 times daily, Disp: 200 each, Rfl: 3     thin (NO BRAND SPECIFIED) lancets, Test 4 times daily, Disp: 200 each, Rfl: 3     STATIN NOT PRESCRIBED, INTENTIONAL,, Statin not prescribed intentionally due to Not indicated based on age, Disp: , Rfl:      loratadine (CLARITIN) 10 MG tablet, Take 1 tablet (10 mg) by mouth daily, Disp: 30 tablet, Rfl: 1     ACE NOT PRESCRIBED, INTENTIONAL,, by Other route continuous prn., Disp: , Rfl: 0     glucagon (GLUCAGON EMERGENCY) 1 MG kit, Inject 1 mg into the muscle once for 1 dose, Disp: 1 mg, Rfl: 0     ALLERGIES:  No Known Allergies     SOCIAL HISTORY:   Social History     Social History     Marital status:      Spouse name: N/A     Number of children: N/A     Years of education: N/A     Occupational History     Not on file.     Social History Main Topics     Smoking status: Passive Smoke Exposure - Never Smoker     Smokeless tobacco: Never Used      Comment: works at [employer]      Alcohol use No     Drug use: No     Sexual activity: Yes     Other Topics Concern     Parent/Sibling W/ Cabg, Mi Or Angioplasty Before 65f 55m? No     Social History Narrative        FAMILY HISTORY:   Family History   Problem Relation Age of Onset     Asthma Father         EXAM:Vitals: Temp 97.7  F (36.5  C) (Temporal)  Ht 5' 11.5\" (1.816 m)  Wt 210 lb (95.3 kg)  BMI 28.88 kg/m2  BMI= Body mass index is 28.88 kg/(m^2).    General appearance: Patient is alert and fully cooperative with history & exam.  No sign of distress is noted during the visit.     Psychiatric: Affect is pleasant & appropriate.  Patient " appears motivated to improve health.     Respiratory: Breathing is regular & unlabored while sitting.     HEENT: Hearing is intact to spoken word.  Speech is clear.  No gross evidence of visual impairment that would impact ambulation.     Vascular: DP & PT pulses are intact & regular, CFT immediate, positive digital hair growth bilaterally.  No significant edema or varicosities noted and skin temperature is normal to both lower extremities.     Neurologic: Lower extremity sensation is intact to light touch.  No evidence of weakness or contracture in the lower extremities.  No evidence of neuropathy.    Dermatologic: Adequate texture, turgor and tone about the integument.  No discoloration or thickening of the toenail however the left lateral second toenail is severely ingrown with an abscess.  Upon avulsion of the lateral border about 1 mL of purulent drainage is noted.  This does not clearly probe to bone or tendon.  Erythema and abscess is localized to the eponychium.      Musculoskeletal: Patient is ambulatory without assistive device or brace.  No gross ankle deformity noted.  No foot or ankle joint effusion is noted.      Hemoglobin A1C (%)   Date Value   02/26/2018 8.8 (H)   08/03/2017 8.6 (H)   01/17/2017 9.4 (H)   09/11/2015 7.9 (H)   02/20/2015 8.7 (H)   08/19/2014 9.0 (H)   05/20/2014 9.6 (H)   11/15/2013 9.2 (H)     Creatinine (mg/dL)   Date Value   08/03/2017 0.96   01/17/2017 0.89   08/19/2014 0.79   12/06/2012 0.78   11/10/2011 0.82   02/25/2011 0.80       ASSESSMENT:       ICD-10-CM    1. Ingrowing nail L60.0 REMOVAL OF NAIL PLATE SIMPLE SINGLE   2. 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  Due to uncontrolled diabetes I would not recommend permanent matrixectomy was slightly higher risk of osteomyelitis delayed wound healing and infection with  current infection.  Therefore recommended avulsion.  I obtained informed consent prepped with alcohol injected 2 mL's of lidocaine to achieve local anesthesia prepped the toe with Betadine and avulsed the lateral left second toenail.  About 1 mL of purulent drainage was exsanguinated from the nail border.  A nonadherent dressing was applied with compression.  He was given written postoperative instructions.  This will regrow starting in 3 months and may take 6-9 months to regrow full-length and if this becomes ingrown again would recommend permanent matrixectomy.  This should be done before abscess noted.    In addition to the above procedure approximately 30 minutes of times spent discussing treatment options as well as risk factors associated with his lower extremities and uncontrolled diabetes.  He is quite engaged in his care and outcome and would like to improve his glucose management to reduce lifetime risk of complication associated with diabetes.  He has recent changes in his insulin pump and glucose monitor.    I did not discuss with him a care coordination consult or nutrition consult but will place him on the uncontrolled diabetes A1C list as I suspect he would be motivated to improve his outcome with further follow-up possibly with diabetes education, or further education and management or modification of his sliding scale, insulin pump, monitor.    Harman Nava DPM      Again, thank you for allowing me to participate in the care of your patient.        Sincerely,        Harman Nava DPM

## 2018-04-04 ENCOUNTER — TELEPHONE (OUTPATIENT)
Dept: EDUCATION SERVICES | Facility: CLINIC | Age: 28
End: 2018-04-04

## 2018-04-04 ENCOUNTER — TELEPHONE (OUTPATIENT)
Dept: FAMILY MEDICINE | Facility: OTHER | Age: 28
End: 2018-04-04

## 2018-04-04 NOTE — TELEPHONE ENCOUNTER
Reached out to patient to offer help with blood sugars or pump settings. Patient feels thinks are going well and does not need help at this time. Encouraged him to call me as needed. He has my direct phone number.     Rita Julien RDN, LD, CDE

## 2018-04-04 NOTE — TELEPHONE ENCOUNTER
Reason for Call:  Form, our goal is to have forms completed with 72 hours, however, some forms may require a visit or additional information.    Type of letter, form or note:  [place] [employer] - Diabetic Form    Who is the form from?: [place] [employer] (if other please explain)    Where did the form come from: Patient or family brought in       What clinic location was the form placed at?: [facility]    Where the form was placed: 's Box    What number is listed as a contact on the form?: [phone]       Additional comments: Patient would like to be notified once form has been completed and faxed    Call taken on 4/4/2018 at 11:05 AM by Prachi Silverman

## 2018-04-05 NOTE — TELEPHONE ENCOUNTER
Informed pt that form has been completed and faxed as requested. Original sent to scanning.  ................Lino Odom LPN,   April 5, 2018,      3:46 PM,   [facility]

## 2018-04-27 DIAGNOSIS — E10.65 TYPE 1 DIABETES MELLITUS WITH HYPERGLYCEMIA (H): Primary | ICD-10-CM

## 2018-06-13 ENCOUNTER — DOCUMENTATION ONLY (OUTPATIENT)
Dept: FAMILY MEDICINE | Facility: CLINIC | Age: 28
End: 2018-06-13

## 2018-06-13 NOTE — PROGRESS NOTES
Met in consultation for Diabetes Care Team Review.     Diabetes    ASA: Passed    Last A1C  Lab Results   Component Value Date    A1C 8.8 02/26/2018    A1C 8.6 08/03/2017    A1C 9.4 01/17/2017    A1C 7.9 09/11/2015    A1C 8.7 02/20/2015     A1C tested: FAILED    Last LDL:    Lab Results   Component Value Date    CHOL 137 01/17/2017     Lab Results   Component Value Date    HDL 51 01/17/2017     Lab Results   Component Value Date    LDL 89 08/03/2017    LDL 78 01/17/2017     Lab Results   Component Value Date    TRIG 42 01/17/2017     Lab Results   Component Value Date    CHOLHDLRATIO 2.2 08/19/2014     Lab Results   Component Value Date    NHDL 86 01/17/2017       Is the patient on a Statin? NO             Is the patient on Aspirin? YES    Medications     HMG CoA Reductase Inhibitors    STATIN NOT PRESCRIBED, INTENTIONAL,    Salicylates    aspirin 81 MG chewable tablet          Last three blood pressure readings:  BP Readings from Last 3 Encounters:   02/26/18 122/82   08/03/17 118/60   01/17/17 122/68       Date of last diabetes office visit: 2/6/18     Tobacco History:     History   Smoking Status     Passive Smoke Exposure - Never Smoker   Smokeless Tobacco     Never Used     Comment: works at [employer]         Summary:    Parameters Addressed:  B/P management, LDL, A1C, ACE/ARB, Statin, Aspirin use, Tobacco and Adherence (compliance)      Questions for provider review:    None                                                                                                  Considerations/Recommendations  none       Plan/Next Steps:   Diabetic review team will continue to monitor    Tawana Siddiqi RN,BSN  Clinical Care Coordinator      Trisha Omalley [facility]  Care Coordination     Rita Julien RDN, LD, CDE  Diabetic     Janice Omalley RN  [facility] Nurse    Tamar Morocho [facility]  Behavioral Health Clinician

## 2018-07-23 ENCOUNTER — OFFICE VISIT (OUTPATIENT)
Dept: FAMILY MEDICINE | Facility: OTHER | Age: 28
End: 2018-07-23
Payer: COMMERCIAL

## 2018-07-23 VITALS
RESPIRATION RATE: 20 BRPM | DIASTOLIC BLOOD PRESSURE: 64 MMHG | HEART RATE: 83 BPM | OXYGEN SATURATION: 98 % | WEIGHT: 210 LBS | BODY MASS INDEX: 28.88 KG/M2 | SYSTOLIC BLOOD PRESSURE: 100 MMHG | TEMPERATURE: 97.6 F

## 2018-07-23 DIAGNOSIS — E10.65 TYPE 1 DIABETES MELLITUS WITH HYPERGLYCEMIA (H): Primary | ICD-10-CM

## 2018-07-23 LAB
CHOLEST SERPL-MCNC: 165 MG/DL
CREAT SERPL-MCNC: 1.11 MG/DL (ref 0.66–1.25)
CREAT UR-MCNC: 264 MG/DL
GFR SERPL CREATININE-BSD FRML MDRD: 78 ML/MIN/1.7M2
HBA1C MFR BLD: 8.6 % (ref 0–5.6)
HDLC SERPL-MCNC: 51 MG/DL
LDLC SERPL CALC-MCNC: 101 MG/DL
MICROALBUMIN UR-MCNC: 10 MG/L
MICROALBUMIN/CREAT UR: 3.83 MG/G CR (ref 0–17)
NONHDLC SERPL-MCNC: 114 MG/DL
TRIGL SERPL-MCNC: 63 MG/DL

## 2018-07-23 PROCEDURE — 99214 OFFICE O/P EST MOD 30 MIN: CPT | Performed by: NURSE PRACTITIONER

## 2018-07-23 PROCEDURE — 82043 UR ALBUMIN QUANTITATIVE: CPT | Performed by: NURSE PRACTITIONER

## 2018-07-23 PROCEDURE — 36415 COLL VENOUS BLD VENIPUNCTURE: CPT | Performed by: NURSE PRACTITIONER

## 2018-07-23 PROCEDURE — 80061 LIPID PANEL: CPT | Performed by: NURSE PRACTITIONER

## 2018-07-23 PROCEDURE — 83036 HEMOGLOBIN GLYCOSYLATED A1C: CPT | Performed by: NURSE PRACTITIONER

## 2018-07-23 PROCEDURE — 82565 ASSAY OF CREATININE: CPT | Performed by: NURSE PRACTITIONER

## 2018-07-23 NOTE — LETTER
July 25, 2018      Kashifsharona Rodriguez  [address]  [facility] [address]        Dear ,    We are writing to inform you of your test results.    Labs all look good except your A1C is too high at 8.6 as we discussed at your clinic visit.     Follow up in 3 months for repeat A1C testing.    Resulted Orders   Hemoglobin A1c   Result Value Ref Range    Hemoglobin A1C 8.6 (H) 0 - 5.6 %      Comment:      Normal <5.7% Prediabetes 5.7-6.4%  Diabetes 6.5% or higher - adopted from ADA   consensus guidelines.     Creatinine   Result Value Ref Range    Creatinine 1.11 0.66 - 1.25 mg/dL    GFR Estimate 78 >60 mL/min/1.7m2      Comment:      Non  GFR Calc    GFR Estimate If Black >90 >60 mL/min/1.7m2      Comment:       GFR Calc   Albumin Random Urine Quantitative with Creat Ratio   Result Value Ref Range    Creatinine Urine 264 mg/dL    Albumin Urine mg/L 10 mg/L    Albumin Urine mg/g Cr 3.83 0 - 17 mg/g Cr   Lipid panel reflex to direct LDL Fasting   Result Value Ref Range    Cholesterol 165 <200 mg/dL    Triglycerides 63 <150 mg/dL    HDL Cholesterol 51 >39 mg/dL    LDL Cholesterol Calculated 101 (H) <100 mg/dL      Comment:      Above desirable:  100-129 mg/dl  Borderline High:  130-159 mg/dL  High:             160-189 mg/dL  Very high:       >189 mg/dl      Non HDL Cholesterol 114 <130 mg/dL       If you have any questions or concerns, please call the clinic at the number listed above.       Sincerely,        SHARMIN Smith CNP

## 2018-07-23 NOTE — PROGRESS NOTES
SUBJECTIVE:   Kashif Rodriguez is a 28 year old male who presents to clinic today for the following health issues:      Diabetes Follow-up    Patient is checking blood sugars: three times daily.   Blood sugar testing frequency justification: Risk of hypoglycemia with medication(s)  Results are as follows:         am -          suppertime - 180         bedtime - 100-200    Diabetic concerns: None     Symptoms of hypoglycemia (low blood sugar): none     Paresthesias (numbness or burning in feet) or sores: No     Date of last diabetic eye exam: within a year    BP Readings from Last 2 Encounters:   07/23/18 100/64   02/26/18 122/82     Hemoglobin A1C (%)   Date Value   02/26/2018 8.8 (H)   08/03/2017 8.6 (H)     LDL Cholesterol Calculated (mg/dL)   Date Value   01/17/2017 78   08/19/2014 74     LDL Cholesterol Direct (mg/dL)   Date Value   08/03/2017 89     He got a new insulin pump and sensor system about 1 month ago.  States his blood glucose levels have been much improved since.  Working with Evelin Jarvis for his pump settings.     Problem list and histories reviewed & adjusted, as indicated.  Additional history: as documented    Patient Active Problem List   Diagnosis     Type 1 diabetes mellitus without complication (H)     FB (foreign body)     Hyperlipidemia LDL goal <100     Ingrown nail     History reviewed. No pertinent surgical history.    Social History   Substance Use Topics     Smoking status: Passive Smoke Exposure - Never Smoker     Smokeless tobacco: Never Used      Comment: works at [employer]      Alcohol use No     Family History   Problem Relation Age of Onset     Asthma Father          Current Outpatient Prescriptions   Medication Sig Dispense Refill     ACE NOT PRESCRIBED, INTENTIONAL, by Other route continuous prn.  0     aspirin 81 MG chewable tablet Take 1 tablet (81 mg) by mouth daily       blood glucose monitoring (NO BRAND SPECIFIED) meter device kit Use to test blood sugar 4 times  daily or as directed. 1 kit 0     blood glucose monitoring (NO BRAND SPECIFIED) test strip Test 4 times daily 200 each 3     Blood Glucose Monitoring Suppl Supplies MISC 1. Solution, alcohol swabs, syringes, glucose tabs for testing 6 times daily;  2. Pump supplies: infusion sets, reservoir syringes/cartridges, iv preps, adhesives, tape, batteries to change every 3 days. 1 each 11     glucagon (GLUCAGON EMERGENCY) 1 MG kit Inject 1 mg into the muscle once for 1 dose 1 mg 0     insulin aspart (NOVOLOG VIAL) 100 UNITS/ML injection INJECT SUB-Q 80 UNITS DAILY PER INSULIN PUMP AND SLIDING SCALE AS NEEDED 30 mL 3     INSULIN PUMP - OUTPATIENT Date last updated:  2/8/17  Medtronic Minimed: Model 530G  BASAL RATES :  12   AM (midnight): 1.1 units/hour , 5AM  1.2, 10AM 2.0,  8PM 1.3  CARB RATIO:  12   AM (midnight): 10 grams  Corection Factor (Sensitivity):  12   AM (midnight): 40 mg/dL  BLOOD GLUCOSE TARGET:  12   AM (midnight): 90 - 100  1     AM:  100 - 120  10   AM:  90 - 100  Active Insulin Time:  4 hours  Sensor:  Yes: SENSOR GLUCOSE LIMITS:  12   AM (midnight): 70 - 300 1 each 0     loratadine (CLARITIN) 10 MG tablet Take 1 tablet (10 mg) by mouth daily 30 tablet 1     STATIN NOT PRESCRIBED, INTENTIONAL, Statin not prescribed intentionally due to Not indicated based on age       thin (NO BRAND SPECIFIED) lancets Test 4 times daily 200 each 3     No Known Allergies  BP Readings from Last 3 Encounters:   07/23/18 100/64   02/26/18 122/82   08/03/17 118/60    Wt Readings from Last 3 Encounters:   07/23/18 210 lb (95.3 kg)   02/28/18 210 lb (95.3 kg)   02/26/18 210 lb (95.3 kg)                    Reviewed and updated as needed this visit by clinical staff  Tobacco  Allergies  Meds  Med Hx  Surg Hx  Fam Hx  Soc Hx      Reviewed and updated as needed this visit by Provider         ROS:  Constitutional, HEENT, cardiovascular, pulmonary, gi and gu systems are negative, except as otherwise noted.    OBJECTIVE:     BP  100/64  Pulse 83  Temp 97.6  F (36.4  C) (Tympanic)  Resp 20  Wt 210 lb (95.3 kg)  SpO2 98%  BMI 28.88 kg/m2  Body mass index is 28.88 kg/(m^2).  GENERAL: healthy, alert and no distress  NECK: no adenopathy, no asymmetry, masses, or scars and thyroid normal to palpation  RESP: lungs clear to auscultation - no rales, rhonchi or wheezes  CV: regular rate and rhythm, normal S1 S2, no S3 or S4, no murmur, click or rub, no peripheral edema and peripheral pulses strong  MS: no gross musculoskeletal defects noted, no edema    Diagnostic Test Results:  Office Visit on 07/23/2018   Component Date Value Ref Range Status     Hemoglobin A1C 07/23/2018 8.6* 0 - 5.6 % Final    Comment: Normal <5.7% Prediabetes 5.7-6.4%  Diabetes 6.5% or higher - adopted from ADA   consensus guidelines.       Creatinine 07/23/2018 1.11  0.66 - 1.25 mg/dL Final     GFR Estimate 07/23/2018 78  >60 mL/min/1.7m2 Final    Non  GFR Calc     GFR Estimate If Black 07/23/2018 >90  >60 mL/min/1.7m2 Final    African American GFR Calc     Creatinine Urine 07/23/2018 264  mg/dL Final     Albumin Urine mg/L 07/23/2018 10  mg/L Final     Albumin Urine mg/g Cr 07/23/2018 3.83  0 - 17 mg/g Cr Final     Cholesterol 07/23/2018 165  <200 mg/dL Final     Triglycerides 07/23/2018 63  <150 mg/dL Final     HDL Cholesterol 07/23/2018 51  >39 mg/dL Final     LDL Cholesterol Calculated 07/23/2018 101* <100 mg/dL Final    Comment: Above desirable:  100-129 mg/dl  Borderline High:  130-159 mg/dL  High:             160-189 mg/dL  Very high:       >189 mg/dl       Non HDL Cholesterol 07/23/2018 114  <130 mg/dL Final           ASSESSMENT/PLAN:         1. Type 1 diabetes mellitus with hyperglycemia (H)  A1C still not at goal, improving slightly, likely from his new pump with much tighter control.  Will continue to work with Evelin Jarvis on pump settings, follow up in 3 months for A1C recheck.   - Hemoglobin A1c  - Creatinine  - Albumin Random Urine  Quantitative with Creat Ratio  - Lipid panel reflex to direct LDL Fasting    FUTURE APPOINTMENTS:       - Follow-up visit in 3 months for A1C recheck.   See Patient Instructions    SHARMIN Smith CNP  [facility]

## 2018-07-23 NOTE — MR AVS SNAPSHOT
After Visit Summary   7/23/2018    Kashif Rodriguez    MRN: [id number]           Patient Information     Date Of Birth          1990        Visit Information        Provider Department      7/23/2018 1:20 PM Coretta Obando APRN CNP [facility]        Today's Diagnoses     Type 1 diabetes mellitus without complication (H)    -  1      Care Instructions    I will send a letter with the other lab results.               Follow-ups after your visit        Who to contact     If you have questions or need follow up information about today's clinic visit or your schedule please contact [facility] directly at [phone].  Normal or non-critical lab and imaging results will be communicated to you by MyChart, letter or phone within 4 business days after the clinic has received the results. If you do not hear from us within 7 days, please contact the clinic through MyChart or phone. If you have a critical or abnormal lab result, we will notify you by phone as soon as possible.  Submit refill requests through [employer] or call your pharmacy and they will forward the refill request to us. Please allow 3 business days for your refill to be completed.          Additional Information About Your Visit        Care EveryWhere ID     This is your Care EveryWhere ID. This could be used by other organizations to access your [facility] medical records  [id number]        Your Vitals Were     Pulse Temperature Respirations Pulse Oximetry BMI (Body Mass Index)       83 97.6  F (36.4  C) (Tympanic) 20 98% 28.88 kg/m2        Blood Pressure from Last 3 Encounters:   07/23/18 100/64   02/26/18 122/82   08/03/17 118/60    Weight from Last 3 Encounters:   07/23/18 210 lb (95.3 kg)   02/28/18 210 lb (95.3 kg)   02/26/18 210 lb (95.3 kg)              We Performed the Following     Albumin Random Urine Quantitative with Creat Ratio     Creatinine     Hemoglobin A1c     Lipid panel reflex to direct  LDL Fasting        Primary Care Provider Office Phone # Fax #    SHARMIN Smith [phone]       [address] [facility] [address]        Equal Access to Services     JAY STEPHEN : Hadii flip ku hadcesaro Soomaali, waaxda luqadaha, qaybta kaalmada adeegyada, young davisn toyajhon gordon laEstefanygloria ugalde. So [facility] [phone].    ATENCIÓN: Si habla español, tiene a chakraborty disposición servicios gratuitos de asistencia lingüística. Llame al [phone].    We comply with applicable federal civil rights laws and [place] laws. We do not discriminate on the basis of race, color, national origin, age, disability, sex, sexual orientation, or gender identity.            Thank you!     Thank you for choosing [facility]  for your care. Our goal is always to provide you with excellent care. Hearing back from our patients is one way we can continue to improve our services. Please take a few minutes to complete the written survey that you may receive in the mail after your visit with us. Thank you!             Your Updated Medication List - Protect others around you: Learn how to safely use, store and throw away your medicines at www.disposemymeds.org.          This list is accurate as of 7/23/18  2:11 PM.  Always use your most recent med list.                   Brand Name Dispense Instructions for use Diagnosis    * ACE NOT PRESCRIBED (INTENTIONAL)      by Other route continuous prn.    Type 1 diabetes, HbA1c goal < 7% (H)       aspirin 81 MG chewable tablet      Take 1 tablet (81 mg) by mouth daily        * blood glucose monitoring meter device kit    no brand specified    1 kit    Use to test blood sugar 4 times daily or as directed.    Type 1 diabetes, HbA1c goal < 7% (H)       * Blood Glucose Monitoring Suppl Supplies Misc     1 each    1. Solution, alcohol swabs, syringes, glucose tabs for testing 6 times daily; 2. Pump supplies: infusion sets, reservoir syringes/cartridges, iv preps,  adhesives, tape, batteries to change every 3 days.    Type 1 diabetes mellitus with hyperglycemia (H)       blood glucose monitoring test strip    no brand specified    200 each    Test 4 times daily    Type 1 diabetes, HbA1c goal < 7% (H)       glucagon 1 MG kit    GLUCAGON EMERGENCY    1 mg    Inject 1 mg into the muscle once for 1 dose    Type 1 diabetes mellitus without complication (H)       insulin aspart 100 UNITS/ML injection    NovoLOG VIAL    30 mL    INJECT SUB-Q 80 UNITS DAILY PER INSULIN PUMP AND SLIDING SCALE AS NEEDED        * insulin pump infusion     1 each    Date last updated:  2/8/17 Medtronic Minimed: Model 530G BASAL RATES : 12   AM (midnight): 1.1 units/hour , 5AM  1.2, 10AM 2.0,  8PM 1.3 CARB RATIO: 12   AM (midnight): 10 grams Corection Factor (Sensitivity): 12   AM (midnight): 40 mg/dL BLOOD GLUCOSE TARGET: 12   AM (midnight): 90 - 100 1     AM:  100 - 120 10   AM:  90 - 100 Active Insulin Time:  4 hours Sensor:  Yes: SENSOR GLUCOSE LIMITS: 12   AM (midnight): 70 - 300        loratadine 10 MG tablet    CLARITIN    30 tablet    Take 1 tablet (10 mg) by mouth daily    URI (upper respiratory infection)       STATIN NOT PRESCRIBED (INTENTIONAL)      Statin not prescribed intentionally due to Not indicated based on age        thin lancets    NO BRAND SPECIFIED    200 each    Test 4 times daily    Type 1 diabetes, HbA1c goal < 7% (H)       * Notice:  This list has 4 medication(s) that are the same as other medications prescribed for you. Read the directions carefully, and ask your doctor or other care provider to review them with you.

## 2018-08-13 DIAGNOSIS — E10.9 TYPE 1 DIABETES MELLITUS WITHOUT COMPLICATION (H): Primary | ICD-10-CM

## 2018-08-14 NOTE — TELEPHONE ENCOUNTER
Routing refill request to provider for review/approval because:  Labs out of range:  LDL    YVONNE TangN, RN  [facility]

## 2018-08-14 NOTE — TELEPHONE ENCOUNTER
"Requested Prescriptions   Pending Prescriptions Disp Refills     NOVOLOG VIAL 100 UNIT/ML soln [Pharmacy Med Name: insulin aspart (NOVOLOG VIAL) 100 UNITS/ML injection] 30 mL      Sig: INJECT 80 UNITS UNDER THE SKIN DAILY PER INSULIN PUMP AND SLIDING SCALE AS NEEDED    Short Acting Insulin Protocol Passed    8/14/2018 11:05 AM       Passed - Blood pressure less than 140/90 in past 6 months    BP Readings from Last 3 Encounters:   07/23/18 100/64   02/26/18 122/82   08/03/17 118/60                Passed - LDL on file in past 12 months    Recent Labs   Lab Test  07/23/18   1358   LDL  101*            Passed - Microalbumin on file in past 12 months    Recent Labs   Lab Test  07/23/18   1415   MICROL  10   UMALCR  3.83            Passed - Serum creatinine on file in past 12 months    Recent Labs   Lab Test  07/23/18   1358   CR  1.11            Passed - HgbA1C in past 3 or 6 months    If HgbA1C is 8 or greater, it needs to be on file within the past 3 months.  If less than 8, must be on file within the past 6 months.     Recent Labs   Lab Test  07/23/18   1358   A1C  8.6*            Passed - Patient is age 18 or older       Passed - Recent (6 mo) or future (30 days) visit within the authorizing provider's specialty    Patient had office visit in the last 6 months or has a visit in the next 30 days with authorizing provider or within the authorizing provider's specialty.  See \"Patient Info\" tab in inbasket, or \"Choose Columns\" in Meds & Orders section of the refill encounter.            Last Written Prescription Date:  2/26/18  Last Fill Quantity: 30 ml,  # refills: 3   Last office visit: 7/23/2018 with prescribing provider:  7/13/18   Future Office Visit:      "

## 2018-09-13 DIAGNOSIS — E10.65 TYPE 1 DIABETES MELLITUS WITH HYPERGLYCEMIA (H): Primary | ICD-10-CM

## 2018-10-25 DIAGNOSIS — E10.9 TYPE 1 DIABETES MELLITUS WITHOUT COMPLICATION (H): ICD-10-CM

## 2018-10-25 NOTE — TELEPHONE ENCOUNTER
Novolog       Last Written Prescription Date:  8/14/18  Last Fill Quantity: 30 mL,   # refills: 1  Last Office Visit: 7/23/18  Future Office visit:       Routing refill request to provider for review/approval because:  Drug not on the [facility] refill protocol or controlled substance

## 2018-10-25 NOTE — TELEPHONE ENCOUNTER
Hard copy Rx faxed to [facility].  ................Lino Odom LPN,   October 25, 2018,      3:09 PM,   [facility]

## 2018-11-12 ENCOUNTER — TELEPHONE (OUTPATIENT)
Dept: FAMILY MEDICINE | Facility: OTHER | Age: 28
End: 2018-11-12

## 2018-11-12 DIAGNOSIS — E10.65 TYPE 1 DIABETES MELLITUS WITH HYPERGLYCEMIA (H): ICD-10-CM

## 2018-11-12 DIAGNOSIS — E10.65 TYPE 1 DIABETES MELLITUS WITH HYPERGLYCEMIA (H): Primary | ICD-10-CM

## 2018-11-12 LAB — HBA1C MFR BLD: 8.4 % (ref 0–5.6)

## 2018-11-12 PROCEDURE — 36415 COLL VENOUS BLD VENIPUNCTURE: CPT | Performed by: NURSE PRACTITIONER

## 2018-11-12 PROCEDURE — 83036 HEMOGLOBIN GLYCOSYLATED A1C: CPT | Performed by: NURSE PRACTITIONER

## 2018-11-13 ENCOUNTER — TELEPHONE (OUTPATIENT)
Dept: FAMILY MEDICINE | Facility: OTHER | Age: 28
End: 2018-11-13

## 2018-11-13 NOTE — LETTER
November 13, 2018      Kashif Rodriguez  [address] [facility] [address]        Dear ,    We are writing to inform you of your test results.     Please continue with current treatment plan. Your A1C came back at 8.4 which is still too high, I would like you to see the [facility] pharmacist to discuss better management of your diabetes. Your referral was placed.     Resulted Orders   **A1C FUTURE anytime   Result Value Ref Range    Hemoglobin A1C 8.4 (H) 0 - 5.6 %      Comment:      Normal <5.7% Prediabetes 5.7-6.4%  Diabetes 6.5% or higher - adopted from ADA   consensus guidelines.         If you have any questions or concerns, please call the clinic at the number listed above.       Sincerely,        SHARMIN Smith CNP

## 2018-11-13 NOTE — TELEPHONE ENCOUNTER
----- Message from SHARMIN Smith CNP sent at 11/12/2018  9:05 AM CST -----  Please notify patient, call or letter    His A1C came back at 8.4, which is still too high.  I would like him to see the [facility] pharmacist to discuss better management of his diabetes.  Referral is placed.      Electronically signed by Coretta Obando CNP.

## 2018-11-26 DIAGNOSIS — E10.9 TYPE 1 DIABETES MELLITUS WITHOUT COMPLICATION (H): ICD-10-CM

## 2018-12-06 DIAGNOSIS — E10.9 TYPE 1 DIABETES, HBA1C GOAL < 7% (H): ICD-10-CM

## 2018-12-07 NOTE — TELEPHONE ENCOUNTER
"Requested Prescriptions   Pending Prescriptions Disp Refills     blood glucose monitoring (NO BRAND SPECIFIED) test strip 200 each 3     Sig: Test 4 times daily    Diabetic Supplies Protocol Passed    12/6/2018  1:45 PM       Passed - Patient is 18 years of age or older       Passed - Recent (6 mo) or future (30 days) visit within the authorizing provider's specialty    Patient had office visit in the last 6 months or has a visit in the next 30 days with authorizing provider.  See \"Patient Info\" tab in inbasket, or \"Choose Columns\" in Meds & Orders section of the refill encounter.            Last OV 07/23/2018  Last filled 04/27/2018    Prescription approved per [facility] Refill Protocol.    Agusto Oakes, RN, BSN          "

## 2018-12-10 DIAGNOSIS — E10.9 TYPE 1 DIABETES MELLITUS WITHOUT COMPLICATION (H): ICD-10-CM

## 2018-12-10 DIAGNOSIS — E10.65 TYPE 1 DIABETES MELLITUS WITH HYPERGLYCEMIA (H): Primary | ICD-10-CM

## 2018-12-10 DIAGNOSIS — E78.5 HYPERLIPIDEMIA LDL GOAL <100: ICD-10-CM

## 2018-12-10 RX ORDER — LANCETS
EACH MISCELLANEOUS
Qty: 400 EACH | Refills: 3 | Status: SHIPPED | OUTPATIENT
Start: 2018-12-10

## 2018-12-10 RX ORDER — GLUCOSAMINE HCL/CHONDROITIN SU 500-400 MG
CAPSULE ORAL
Qty: 400 EACH | Refills: 3 | Status: SHIPPED | OUTPATIENT
Start: 2018-12-10

## 2018-12-10 NOTE — TELEPHONE ENCOUNTER
Signed Prescriptions:                        Disp   Refills    blood glucose monitoring (NO BRAND SPECIFI*1 kit  0        Sig: Use to test blood sugar 4 times daily or as directed.           : Blood Glucose Monitor Brands: per insurance.  Authorizing Provider: MAYELA CANNON  Ordering User: AGUSTO ACKERMAN    blood glucose monitoring (NO BRAND SPECIFI*400 st*3        Sig: Use to test blood sugar 4 times daily or as directed.           To accompany: Blood Glucose Monitor Brands: per           insurance.  Authorizing Provider: MAYELA ACNNON  Ordering User: AGUSTO ACKERMAN    blood glucose calibration (NO BRAND SPECIF*1 [place]*3        Sig: To accompany: Blood Glucose Monitor Brands: per           insurance.  Authorizing Provider: MAYELA CANNON  Ordering User: AGUSTO ACKERMAN    thin (NO BRAND SPECIFIED) lancets          400 ea*3        Sig: Use with lanceting device. To accompany: Blood           Glucose Monitor Brands: per insurance.  Authorizing Provider: MAYELA CANNON  Ordering User: AGUSTO ACKERMAN    alcohol swab prep pads                     400 ea*3        Sig: Use to swab area of injection/erwin as directed.  Authorizing Provider: MAYELA CANNON  Ordering User: AGUSTO ACKERMAN      Prescription approved per [facility] Refill Protocol.  Agusto Ackerman, RN, BSN

## 2018-12-10 NOTE — TELEPHONE ENCOUNTER
[facility] is requesting Accu-Check testing supplies for this patient   Codi Gregory CMA ([facility])

## 2019-01-21 ENCOUNTER — DOCUMENTATION ONLY (OUTPATIENT)
Dept: FAMILY MEDICINE | Facility: OTHER | Age: 29
End: 2019-01-21

## 2019-01-30 NOTE — PROGRESS NOTES
Diabetic Review Team Follow up  Gaps identified on last contact:  None  Plan from last contact:  Diabetes team to continue to monitor   Progress: Pt saw PCP last on 7/23/18- to follow up in 3 months-   New Gaps Identified: Pt did have A1C drawn in Nov. Still high. PCP recommended see MTM so referral was placed on 11/12/18. Pt did not have appointment with PCP at this time. Pt cancelled appointment with MTM for 12/5/18 was going to see Yvonne in [place]. PCP sent letter to pt 12/13/18 requesting he schedule appointment to f/u for diabetes check. No appointments scheduled    Lab Results   Component Value Date    A1C 8.4 11/12/2018    A1C 8.6 07/23/2018    A1C 8.8 02/26/2018    A1C 8.6 08/03/2017    A1C 9.4 01/17/2017     New/Continuing plan:  RN to call patient and schedule follow up with PCP.         Trisha Omalley [facility]  Care Coordination     Rita Julien RDN, LD, CDE  Diabetic     Zack LedesmaD  MT pharmacist    Janice Omalley RN  [facility] Nurse

## 2019-02-04 DIAGNOSIS — E10.65 TYPE 1 DIABETES MELLITUS WITH HYPERGLYCEMIA (H): Primary | ICD-10-CM

## 2019-02-06 ENCOUNTER — OFFICE VISIT (OUTPATIENT)
Dept: FAMILY MEDICINE | Facility: OTHER | Age: 29
End: 2019-02-06
Payer: COMMERCIAL

## 2019-02-06 VITALS
WEIGHT: 226 LBS | SYSTOLIC BLOOD PRESSURE: 120 MMHG | DIASTOLIC BLOOD PRESSURE: 84 MMHG | OXYGEN SATURATION: 99 % | HEART RATE: 108 BPM | BODY MASS INDEX: 31.08 KG/M2 | TEMPERATURE: 98.1 F

## 2019-02-06 DIAGNOSIS — Z23 NEED FOR PROPHYLACTIC VACCINATION AND INOCULATION AGAINST INFLUENZA: ICD-10-CM

## 2019-02-06 DIAGNOSIS — E10.65 TYPE 1 DIABETES MELLITUS WITH HYPERGLYCEMIA (H): Primary | ICD-10-CM

## 2019-02-06 LAB
HBA1C MFR BLD: 8.1 % (ref 0–5.6)
TSH SERPL DL<=0.005 MIU/L-ACNC: 1.47 MU/L (ref 0.4–4)

## 2019-02-06 PROCEDURE — 36415 COLL VENOUS BLD VENIPUNCTURE: CPT | Performed by: NURSE PRACTITIONER

## 2019-02-06 PROCEDURE — 84443 ASSAY THYROID STIM HORMONE: CPT | Performed by: NURSE PRACTITIONER

## 2019-02-06 PROCEDURE — 99214 OFFICE O/P EST MOD 30 MIN: CPT | Mod: 25 | Performed by: NURSE PRACTITIONER

## 2019-02-06 PROCEDURE — 90686 IIV4 VACC NO PRSV 0.5 ML IM: CPT | Performed by: NURSE PRACTITIONER

## 2019-02-06 PROCEDURE — 83036 HEMOGLOBIN GLYCOSYLATED A1C: CPT | Performed by: NURSE PRACTITIONER

## 2019-02-06 PROCEDURE — 90471 IMMUNIZATION ADMIN: CPT | Performed by: NURSE PRACTITIONER

## 2019-02-06 NOTE — PROGRESS NOTES
SUBJECTIVE:   Kashif Rodriguez is a 28 year old male who presents to clinic today for the following health issues:      Diabetes Follow-up    Patient is checking blood sugars: twice daily.    Blood sugar testing frequency justification: Uncontrolled diabetes  Results are as follows:         am - 130-230         bedtime - 130-230    Diabetic concerns: blood sugar frequently over 200     Symptoms of hypoglycemia (low blood sugar): shaky, dizzy, weak, lethargy     Paresthesias (numbness or burning in feet) or sores: No     Date of last diabetic eye exam: last spring    BP Readings from Last 2 Encounters:   02/06/19 120/84   07/23/18 100/64     Hemoglobin A1C (%)   Date Value   11/12/2018 8.4 (H)   07/23/2018 8.6 (H)     LDL Cholesterol Calculated (mg/dL)   Date Value   07/23/2018 101 (H)   01/17/2017 78     LDL Cholesterol Direct (mg/dL)   Date Value   08/03/2017 89       Diabetes Management Resources    Amount of exercise or physical activity: 2-3 days/week for an average of 30-45 minutes    Problems taking medications regularly: No    Medication side effects: none    Diet: carbohydrate counting    Has been doing carb counting and has lost some weight.   This has helped his blood sugars improve.  Just started this about 4 weeks ago.        Problem list and histories reviewed & adjusted, as indicated.  Additional history: as documented    Patient Active Problem List   Diagnosis     Type 1 diabetes mellitus without complication (H)     FB (foreign body)     Hyperlipidemia LDL goal <100     Ingrown nail     Type 1 diabetes mellitus with hyperglycemia (H)     No past surgical history on file.    Social History     Tobacco Use     Smoking status: Passive Smoke Exposure - Never Smoker     Smokeless tobacco: Never Used     Tobacco comment: works at [employer]    Substance Use Topics     Alcohol use: No     Family History   Problem Relation Age of Onset     Asthma Father          Current Outpatient Medications   Medication  Sig Dispense Refill     alcohol swab prep pads Use to swab area of injection/erwin as directed. 400 each 3     aspirin 81 MG chewable tablet Take 1 tablet (81 mg) by mouth daily       blood glucose calibration (NO BRAND SPECIFIED) solution To accompany: Blood Glucose Monitor Brands: per insurance. 1 Bottle 3     blood glucose monitoring (NO BRAND SPECIFIED) meter device kit Use to test blood sugar 4 times daily or as directed. : Blood Glucose Monitor Brands: per insurance. 1 kit 0     blood glucose monitoring (NO BRAND SPECIFIED) test strip Use to test blood sugar 4 times daily or as directed. To accompany: Blood Glucose Monitor Brands: per insurance. 400 strip 3     INSULIN PUMP - OUTPATIENT Date last updated:  2/8/17  Medtronic Minimed: Model 530G  BASAL RATES :  12   AM (midnight): 1.1 units/hour , 5AM  1.2, 10AM 2.0,  8PM 1.3  CARB RATIO:  12   AM (midnight): 10 grams  Corection Factor (Sensitivity):  12   AM (midnight): 40 mg/dL  BLOOD GLUCOSE TARGET:  12   AM (midnight): 90 - 100  1     AM:  100 - 120  10   AM:  90 - 100  Active Insulin Time:  4 hours  Sensor:  Yes: SENSOR GLUCOSE LIMITS:  12   AM (midnight): 70 - 300 1 each 0     loratadine (CLARITIN) 10 MG tablet Take 1 tablet (10 mg) by mouth daily 30 tablet 1     NOVOLOG VIAL 100 UNIT/ML soln INJECT 80 UNITS UNDER THE SKIN DAILY PER INSULIN PUMP AND SLIDING SCALE AS NEEDED 30 mL 0     thin (NO BRAND SPECIFIED) lancets Use with lanceting device. To accompany: Blood Glucose Monitor Brands: per insurance. 400 each 3     ACE NOT PRESCRIBED, INTENTIONAL, by Other route continuous prn.  0     glucagon (GLUCAGON EMERGENCY) 1 MG kit Inject 1 mg into the muscle once for 1 dose 1 mg 0     STATIN NOT PRESCRIBED, INTENTIONAL, Statin not prescribed intentionally due to Not indicated based on age       No Known Allergies  BP Readings from Last 3 Encounters:   02/06/19 120/84   07/23/18 100/64   02/26/18 122/82    Wt Readings from Last 3 Encounters:   02/06/19 102.5 kg  (226 lb)   07/23/18 95.3 kg (210 lb)   02/28/18 95.3 kg (210 lb)                    Reviewed and updated as needed this visit by clinical staff  Tobacco  Allergies  Meds       Reviewed and updated as needed this visit by Provider         ROS:  Constitutional, HEENT, cardiovascular, pulmonary, gi and gu systems are negative, except as otherwise noted.    OBJECTIVE:     /84   Pulse 108   Temp 98.1  F (36.7  C) (Temporal)   Wt 102.5 kg (226 lb)   SpO2 99%   BMI 31.08 kg/m    Body mass index is 31.08 kg/m .  GENERAL: healthy, alert and no distress  NECK: no adenopathy, no asymmetry, masses, or scars and thyroid normal to palpation  RESP: lungs clear to auscultation - no rales, rhonchi or wheezes  CV: regular rate and rhythm, normal S1 S2, no S3 or S4, no murmur, click or rub, no peripheral edema and peripheral pulses strong  MS: no gross musculoskeletal defects noted, no edema    Diagnostic Test Results:  Results for orders placed or performed in visit on 02/06/19 (from the past 24 hour(s))   Hemoglobin A1c   Result Value Ref Range    Hemoglobin A1C 8.1 (H) 0 - 5.6 %       ASSESSMENT/PLAN:         1. Type 1 diabetes mellitus with hyperglycemia (H)  Not at goal, but improving.  His A1C is down from 8.4 to 8.1.  Has been working on dietary changes for the last 4 months and plans to continue with these.  Recheck A1C in 3 months with lab only visit.   - Hemoglobin A1c  - TSH with free T4 reflex  - **A1C FUTURE 3mo; Future    2. Need for prophylactic vaccination and inoculation against influenza  - FLU VACCINE, SPLIT VIRUS, IM (QUADRIVALENT) [87983]- >3 YRS  - Vaccine Administration, Initial [97958]    See Patient Instructions    SHARMIN Smith [facility]    Injectable Influenza Immunization Documentation    1.  Is the person to be vaccinated sick today?   No    2. Does the person to be vaccinated have an allergy to a component   of the vaccine?   No  Egg Allergy Algorithm Link    3.  Has the person to be vaccinated ever had a serious reaction   to influenza vaccine in the past?   No    4. Has the person to be vaccinated ever had Guillain-Barré syndrome?   No    Form completed by Electronically signed by Coretta Obando CNP.

## 2019-04-25 DIAGNOSIS — E10.65 TYPE 1 DIABETES MELLITUS WITH HYPERGLYCEMIA (H): ICD-10-CM

## 2019-04-29 NOTE — TELEPHONE ENCOUNTER
"Prescription approved per RN refill protocol.  Tamar Metz RN, BSN      Novolog    Last Written Prescription Date:  3/25/2019  Last Fill Quantity: 30,  # refills: 0   Last office visit: 2/6/2019 with prescribing provider:  je   Future Office Visi:      Requested Prescriptions   Pending Prescriptions Disp Refills     insulin aspart (NOVOLOG VIAL) 100 UNITS/ML vial 30 mL 0     Sig: INJECT 80 UNITS UNDER THE SKIN DAILY PER INSULIN PUMP AND SLIDING SCALE AS NEEDED       Short Acting Insulin Protocol Passed - 4/25/2019  2:08 PM        Passed - Blood pressure less than 140/90 in past 6 months     BP Readings from Last 3 Encounters:   02/06/19 120/84   07/23/18 100/64   02/26/18 122/82                 Passed - LDL on file in past 12 months     Recent Labs   Lab Test 07/23/18  1358   *             Passed - Microalbumin on file in past 12 months     Recent Labs   Lab Test 07/23/18  1415   MICROL 10   UMALCR 3.83             Passed - Serum creatinine on file in past 12 months     Recent Labs   Lab Test 07/23/18  1358   CR 1.11             Passed - HgbA1C in past 3 or 6 months     If HgbA1C is 8 or greater, it needs to be on file within the past 3 months.  If less than 8, must be on file within the past 6 months.     Recent Labs   Lab Test 02/06/19  1440   A1C 8.1*             Passed - Medication is active on med list        Passed - Patient is age 18 or older        Passed - Recent (6 mo) or future (30 days) visit within the authorizing provider's specialty     Patient had office visit in the last 6 months or has a visit in the next 30 days with authorizing provider or within the authorizing provider's specialty.  See \"Patient Info\" tab in inbasket, or \"Choose Columns\" in Meds & Orders section of the refill encounter.              Tamar Metz RN on 4/29/2019 at 10:23 AM    "

## 2019-05-07 ENCOUNTER — TELEPHONE (OUTPATIENT)
Dept: FAMILY MEDICINE | Facility: OTHER | Age: 29
End: 2019-05-07

## 2019-05-07 NOTE — TELEPHONE ENCOUNTER
PA initiated via Cover my Meds  KEY: [id number]    Prior Authorization Retail Medication Request    Medication/Dose: Contour Next test strips  ICD code (if different than what is on RX):    Type 1 diabetes mellitus with hyperglycemia (H) [E10.65]  - Primary       Type 1 diabetes mellitus without complication (H)          Previously Tried and Failed:  na  Rationale:  na    Insurance Name:  [employer]  Insurance ID:  PA initiated via Cover my Meds      Pharmacy Information (if different than what is on RX)  Name:  [facility] pharmacy  Phone:  [phone]

## 2019-05-10 NOTE — TELEPHONE ENCOUNTER
PA Initiation    Medication: Contour Next test strips -   Insurance Company: [employer] - Phone [phone] Fax [phone]  Pharmacy Filling the Rx: [facility] - [place], [place] - [address] AT PORTAL TO REGISTERED [facility] SIT[address]  Filling Pharmacy Phone:    Filling Pharmacy Fax:    Start Date: 5/10/2019

## 2019-05-13 NOTE — TELEPHONE ENCOUNTER
Prior Authorization Approval    Authorization Effective Date: 5/10/2019  Authorization Expiration Date: 5/10/2020  Medication: Contour Next test strips - APPROVED  Approved Dose/Quantity:   Reference #:     Insurance Company: [employer] - Phone [phone] Fax [phone]  Expected CoPay:       CoPay Card Available:      Foundation Assistance Needed:    Which Pharmacy is filling the prescription (Not needed for infusion/clinic administered): [facility] [place] [place] - [address] AT PORTAL TO REG[facility]ED [facility]  Pharmacy Notified: Comment:  **Instructed pharmacy to notify patient when script is ready to /ship.**  Patient Notified:

## 2019-05-15 ENCOUNTER — DOCUMENTATION ONLY (OUTPATIENT)
Dept: FAMILY MEDICINE | Facility: OTHER | Age: 29
End: 2019-05-15

## 2019-05-15 NOTE — PROGRESS NOTES
Diabetic Review Team Follow up  Gaps identified on last contact:  None  Plan from last contact:  Continue to monitor  Progress: Patient had appt on 2/6/2019.  Doing well. Will follow up with lab appt in three months. A1C decreased from 8.4 to 8.1.  Patient has been increasing his activity and carb counting. He lost some weight.   New Gaps Identified: none  New/Continuing plan:  Improving-continue to monitor. Will review in June to make sure patient has done labs.

## 2019-07-31 ENCOUNTER — DOCUMENTATION ONLY (OUTPATIENT)
Dept: FAMILY MEDICINE | Facility: OTHER | Age: 29
End: 2019-07-31

## 2019-07-31 NOTE — PROGRESS NOTES
"Diabetic Review Team Follow up  Gaps identified on last contact:  none  Plan from last contact:  \"Improving-continue to monitor. Will review in June to make sure patient has done labs.\"  Progress: none  Current situation: writer called patient. patient doing well with insulin pump and sensor except when he runs out of insulin and there's a delay in refill. Has port issues where it leaks but thinks this has resolved now. Takes BG 3-4 x per day. Has been . A little higher in the AM. Hasn't been in for A1C secondary to lack of finances. patient will call insurance next week about co-pays, costs, etc. Patient says he will get A1C drawn next week either way.  New Gaps Identified: Financial struggles.  New/Continuing plan:  I will review with DRT and create plan. In addition, I will check patient to ensure A1C is drawn, then create plan. Of note: patient has MTM referral but hasn't completed yet.    Marguerite Padilla, RN on 7/31/2019 at 1:32 PM    Tawana Siddiqi RN,BSN  Clinical Care Coordinator      Rita Julien RDN, LD, CDE  Diabetic     Zack Marrero PharmD  MTM pharmacist    Janice Omalley RN  [facility] Nurse    Tamar Morocho [facility]  Behavioral Health Clinician     Marguerite Padilla RN  Specialty Clinic    "

## 2019-08-14 NOTE — PROGRESS NOTES
"Attempted call. Straight to VM. \"voicemailbox not set up\" so unable to LM.    Marguerite Padilla RN on 8/14/2019 at 1:54 PM    "

## 2019-08-19 NOTE — PROGRESS NOTES
LAKSHMI with patient indicating I have some resources for him in follow up from our previous conversation. Requested he call me back to I can share with him some financial assistance services.  Marguerite Padilla RN on 8/19/2019 at 9:17 AM

## 2019-08-23 ENCOUNTER — TELEPHONE (OUTPATIENT)
Dept: FAMILY MEDICINE | Facility: OTHER | Age: 29
End: 2019-08-23

## 2019-08-23 DIAGNOSIS — E10.65 TYPE 1 DIABETES MELLITUS WITH HYPERGLYCEMIA (H): Primary | ICD-10-CM

## 2019-08-23 DIAGNOSIS — E10.65 TYPE 1 DIABETES MELLITUS WITH HYPERGLYCEMIA (H): ICD-10-CM

## 2019-08-23 LAB — HBA1C MFR BLD: 8.5 % (ref 0–5.6)

## 2019-08-23 PROCEDURE — 36415 COLL VENOUS BLD VENIPUNCTURE: CPT | Performed by: NURSE PRACTITIONER

## 2019-08-23 PROCEDURE — 83036 HEMOGLOBIN GLYCOSYLATED A1C: CPT | Performed by: NURSE PRACTITIONER

## 2019-08-23 NOTE — TELEPHONE ENCOUNTER
Left message for patient, he will be contacted by MTM scheduling.  If patient calls back please relay message below regarding A1C and referral to MTM.    Elly GARIBAYO/

## 2019-08-23 NOTE — TELEPHONE ENCOUNTER
----- Message from SHARMIN Smith CNP sent at 8/23/2019  2:26 PM CDT -----  Please call patient.  His A1C is too high at 8.5.  I would like him to see the  [facility] pharmacist to discuss better management of this.     Electronically signed by Coretta Obando CNP.

## 2019-08-27 ENCOUNTER — TELEPHONE (OUTPATIENT)
Dept: FAMILY MEDICINE | Facility: OTHER | Age: 29
End: 2019-08-27

## 2019-08-27 NOTE — TELEPHONE ENCOUNTER
MTM referral from: [facility] visit (referral by provider)    MTM referral outreach attempt #2 on August 27, 2019 at 12:11 PM      Outcome: Patient not reachable after several attempts, will route to MTM Pharmacist/Provider as an FYI. Thank you for the referral.    Yu Garcia, MTM Coordinator

## 2019-08-27 NOTE — LETTER
August 28, 2019      Kashif Rodriguez  [address] [facility] [address]        Dear Kashif,      Your provider, Coretta Obando, has recommended you schedule a Medication Therapy Management (MTM) appointment. MTM is designed to help you get the most of out of your medicines.     During an MTM appointment a specially trained pharmacist will review all of your medicines, both prescription and over-the-counter. They will make sure your medicines are the best choice for you and are safe and convenient for you.  MTM pharmacists work together with you and your doctor to help you understand your medicines, solve any problems related to your medicines and help you get the best results from taking your medicines.     At [facility], we strongly believe in a team approach to health care. We want to help you understand your medicines and health conditions. To learn more about how you might benefit from MTM services, watch the patient video at [email or web address].     To make an appointment, please call the clinic at [phone] or the MTM scheduling line at [phone] (toll-free at [phone]).    We look forward to hearing from you!        Carmen Reese, PharmD  Medication Therapy Management Pharmacist  Pager: [phone]

## 2019-08-28 NOTE — TELEPHONE ENCOUNTER
Sent referral letter.    Carmen Reese, PharmD  Medication Therapy Management Pharmacist  Pager: [phone]

## 2019-09-07 DIAGNOSIS — E10.65 TYPE 1 DIABETES MELLITUS WITH HYPERGLYCEMIA (H): ICD-10-CM

## 2019-09-09 NOTE — TELEPHONE ENCOUNTER
"Routing refill request to provider for review/approval because:  Labs out of range:  BP  Labs not current:  LDL, CR  T'd up 1 month for provider review.          Requested Prescriptions   Pending Prescriptions Disp Refills     insulin aspart (NOVOLOG VIAL) 100 UNITS/ML vial 30 mL 0     Sig: INJECT 80 UNITS UNDER THE SKIN DAILY PER INSULIN PUMP AND SLIDING SCALE AS NEEDED   Last Written Prescription Date:  7/30/2019  Last Fill Quantity: 30 ml,  # refills: 0   Last office visit: 2/6/2019 with prescribing provider:     Future Office Visit:        Short Acting Insulin Protocol Failed - 9/7/2019  1:24 PM        Failed - Blood pressure less than 140/90 in past 6 months     BP Readings from Last 3 Encounters:   02/06/19 120/84   07/23/18 100/64   02/26/18 122/82           Failed - LDL on file in past 12 months     Recent Labs   Lab Test 07/23/18  1358   *           Failed - Serum creatinine on file in past 12 months     Recent Labs   Lab Test 07/23/18  1358   CR 1.11           Failed - Recent (6 mo) or future (30 days) visit within the authorizing provider's specialty     Patient had office visit in the last 6 months or has a visit in the next 30 days with authorizing provider or within the authorizing provider's specialty.  See \"Patient Info\" tab in inbasket, or \"Choose Columns\" in Meds & Orders section of the refill encounter.          Passed - Microalbumin on file in past 12 months     Recent Labs   Lab Test 07/23/18  1415   MICROL 10   UMALCR 3.83           Passed - HgbA1C in past 3 or 6 months     If HgbA1C is 8 or greater, it needs to be on file within the past 3 months.  If less than 8, must be on file within the past 6 months.     Recent Labs   Lab Test 08/23/19  1137   A1C 8.5*           Passed - Medication is active on med list        Passed - Patient is age 18 or older          "

## 2019-10-17 DIAGNOSIS — E10.65 TYPE 1 DIABETES MELLITUS WITH HYPERGLYCEMIA (H): ICD-10-CM

## 2019-10-17 NOTE — TELEPHONE ENCOUNTER
Routing refill request to provider for review/approval because:  Tamara given x1 and patient did not follow up, please advise  Labs out of range:  LDL  Labs not current:  BP, LDL, Microlabumin, Creatinine  Patient needs to be seen because:  Due for 6 month diabetic follow up    YVONNE TangN, RN  [facility]

## 2019-10-17 NOTE — TELEPHONE ENCOUNTER
Please call patient to inform him he is overdue for diabetic follow up in clinic with Coretta. Per our records a mary period to allow him to schedule this visit was already provided. Please help him schedule a visit and I can send out refills to get him to that date.    Greg Bishop PA-C on 10/17/2019 at 2:24 PM

## 2019-10-17 NOTE — TELEPHONE ENCOUNTER
"Requested Prescriptions   Pending Prescriptions Disp Refills     insulin aspart (NOVOLOG VIAL) 100 UNITS/ML vial 30 mL 0     Sig: INJECT 80 UNITS UNDER THE SKIN DAILY PER INSULIN PUMP AND SLIDING SCALE AS NEEDED   Last Written Prescription Date:  9/9/19  Last Fill Quantity: 30 mL,  # refills: 0   Last office visit: 2/6/2019 with prescribing provider:  2/6/19   Future Office Visit:        Short Acting Insulin Protocol Failed - 10/17/2019 11:58 AM        Failed - Blood pressure less than 140/90 in past 6 months     BP Readings from Last 3 Encounters:   02/06/19 120/84   07/23/18 100/64   02/26/18 122/82                 Failed - LDL on file in past 12 months     Recent Labs   Lab Test 07/23/18  1358   *             Failed - Microalbumin on file in past 12 months     Recent Labs   Lab Test 07/23/18  1415   MICROL 10   UMALCR 3.83             Failed - Serum creatinine on file in past 12 months     Recent Labs   Lab Test 07/23/18  1358   CR 1.11             Failed - Recent (6 mo) or future (30 days) visit within the authorizing provider's specialty     Patient had office visit in the last 6 months or has a visit in the next 30 days with authorizing provider or within the authorizing provider's specialty.  See \"Patient Info\" tab in inbasket, or \"Choose Columns\" in Meds & Orders section of the refill encounter.            Passed - HgbA1C in past 3 or 6 months     If HgbA1C is 8 or greater, it needs to be on file within the past 3 months.  If less than 8, must be on file within the past 6 months.     Recent Labs   Lab Test 08/23/19  1137   A1C 8.5*             Passed - Medication is active on med list        Passed - Patient is age 18 or older        "

## 2019-10-29 ENCOUNTER — OFFICE VISIT (OUTPATIENT)
Dept: FAMILY MEDICINE | Facility: OTHER | Age: 29
End: 2019-10-29
Payer: COMMERCIAL

## 2019-10-29 VITALS
TEMPERATURE: 97.8 F | BODY MASS INDEX: 30.94 KG/M2 | WEIGHT: 221 LBS | DIASTOLIC BLOOD PRESSURE: 70 MMHG | RESPIRATION RATE: 20 BRPM | HEIGHT: 71 IN | OXYGEN SATURATION: 97 % | HEART RATE: 99 BPM | SYSTOLIC BLOOD PRESSURE: 116 MMHG

## 2019-10-29 DIAGNOSIS — E10.65 TYPE 1 DIABETES MELLITUS WITH HYPERGLYCEMIA (H): Primary | ICD-10-CM

## 2019-10-29 DIAGNOSIS — Z23 NEED FOR PROPHYLACTIC VACCINATION AND INOCULATION AGAINST INFLUENZA: ICD-10-CM

## 2019-10-29 DIAGNOSIS — E10.65 TYPE 1 DIABETES MELLITUS WITH HYPERGLYCEMIA (H): ICD-10-CM

## 2019-10-29 DIAGNOSIS — E78.5 HYPERLIPIDEMIA LDL GOAL <100: ICD-10-CM

## 2019-10-29 LAB
ANION GAP SERPL CALCULATED.3IONS-SCNC: 4 MMOL/L (ref 3–14)
BUN SERPL-MCNC: 11 MG/DL (ref 7–30)
CALCIUM SERPL-MCNC: 8.8 MG/DL (ref 8.5–10.1)
CHLORIDE SERPL-SCNC: 107 MMOL/L (ref 94–109)
CHOLEST SERPL-MCNC: 150 MG/DL
CO2 SERPL-SCNC: 27 MMOL/L (ref 20–32)
CREAT SERPL-MCNC: 0.93 MG/DL (ref 0.66–1.25)
CREAT UR-MCNC: 181 MG/DL
GFR SERPL CREATININE-BSD FRML MDRD: >90 ML/MIN/{1.73_M2}
GLUCOSE SERPL-MCNC: 232 MG/DL (ref 70–99)
HBA1C MFR BLD: 9.1 % (ref 0–5.6)
HDLC SERPL-MCNC: 51 MG/DL
LDLC SERPL CALC-MCNC: 89 MG/DL
LDLC SERPL DIRECT ASSAY-MCNC: 82 MG/DL
MICROALBUMIN UR-MCNC: 12 MG/L
MICROALBUMIN/CREAT UR: 6.63 MG/G CR (ref 0–17)
NONHDLC SERPL-MCNC: 99 MG/DL
POTASSIUM SERPL-SCNC: 4.2 MMOL/L (ref 3.4–5.3)
SODIUM SERPL-SCNC: 138 MMOL/L (ref 133–144)
TRIGL SERPL-MCNC: 50 MG/DL

## 2019-10-29 PROCEDURE — 36415 COLL VENOUS BLD VENIPUNCTURE: CPT | Performed by: NURSE PRACTITIONER

## 2019-10-29 PROCEDURE — 99214 OFFICE O/P EST MOD 30 MIN: CPT | Mod: 25 | Performed by: NURSE PRACTITIONER

## 2019-10-29 PROCEDURE — 80048 BASIC METABOLIC PNL TOTAL CA: CPT | Performed by: NURSE PRACTITIONER

## 2019-10-29 PROCEDURE — 83036 HEMOGLOBIN GLYCOSYLATED A1C: CPT | Performed by: NURSE PRACTITIONER

## 2019-10-29 PROCEDURE — 90686 IIV4 VACC NO PRSV 0.5 ML IM: CPT | Performed by: NURSE PRACTITIONER

## 2019-10-29 PROCEDURE — 82043 UR ALBUMIN QUANTITATIVE: CPT | Performed by: NURSE PRACTITIONER

## 2019-10-29 PROCEDURE — 83721 ASSAY OF BLOOD LIPOPROTEIN: CPT | Performed by: NURSE PRACTITIONER

## 2019-10-29 PROCEDURE — 90471 IMMUNIZATION ADMIN: CPT | Performed by: NURSE PRACTITIONER

## 2019-10-29 PROCEDURE — 80061 LIPID PANEL: CPT | Performed by: NURSE PRACTITIONER

## 2019-10-29 ASSESSMENT — MIFFLIN-ST. JEOR: SCORE: 1982.75

## 2019-10-29 ASSESSMENT — PAIN SCALES - GENERAL: PAINLEVEL: NO PAIN (0)

## 2019-10-29 NOTE — PROGRESS NOTES
Subjective     Kashif Rodriguez is a 29 year old male who presents to clinic today for the following health issues:    HPI   Diabetes Follow-up     How often are you checking your blood sugar? Three times daily  Blood sugar testing frequency justification:  Uncontrolled diabetes  What time of day are you checking your blood sugars (select all that apply)?  Before and after meals and At bedtime  Have you had any blood sugars above 200?  Yes   Have you had any blood sugars below 70?  No    What symptoms do you notice when your blood sugar is low?  Weak and irritable, hungry    What concerns do you have today about your diabetes? Other: expensive     Do you have any of these symptoms? (Select all that apply)  Excessive thirst     Have you had a diabetic eye exam in the last 12 months? No    BP Readings from Last 2 Encounters:   10/29/19 116/70   02/06/19 120/84     Hemoglobin A1C (%)   Date Value   10/29/2019 9.1 (H)   08/23/2019 8.5 (H)     LDL Cholesterol Calculated (mg/dL)   Date Value   07/23/2018 101 (H)   01/17/2017 78     LDL Cholesterol Direct (mg/dL)   Date Value   08/03/2017 89     He has been out of pump supplies for the past month - has been using old supplies that don't work well - they leak and he has to change them more frequently.  His blood sugars have been higher than normal due to this.  He has contacted [employer] and is expecting new supplies today.        Hyperlipidemia Follow-Up      Are you having any of the following symptoms? (Select all that apply)  No complaints of shortness of breath, chest pain or pressure.  No increased sweating or nausea with activity.  No left-sided neck or arm pain.  No complaints of pain in calves when walking 1-2 blocks.    Are you regularly taking any medication or supplement to lower your cholesterol?   No    Are you having muscle aches or other side effects that you think could be caused by your cholesterol lowering medication?  No      How many servings of  "fruits and vegetables do you eat daily?  0-1    On average, how many sweetened beverages do you drink each day (soda, juice, sweet tea, etc)?   0  How many days per week do you miss taking your medication? 1    What makes it hard for you to take your medications?  remembering to take and getting too busy      Blood sugar testing frequency justification:  Uncontrolled diabetes      Reviewed and updated as needed this visit by Provider         Review of Systems   ROS COMP: Constitutional, HEENT, cardiovascular, pulmonary, gi and gu systems are negative, except as otherwise noted.      Objective    /70   Pulse 99   Temp 97.8  F (36.6  C) (Temporal)   Resp 20   Ht 1.808 m (5' 11.2\")   Wt 100.2 kg (221 lb)   SpO2 97%   BMI 30.65 kg/m    Body mass index is 30.65 kg/m .  Physical Exam   GENERAL: healthy, alert and no distress  NECK: no adenopathy, no asymmetry, masses, or scars and thyroid normal to palpation  RESP: lungs clear to auscultation - no rales, rhonchi or wheezes  CV: regular rate and rhythm, normal S1 S2, no S3 or S4, no murmur, click or rub, no peripheral edema and peripheral pulses strong  MS: no gross musculoskeletal defects noted, no edema    Diagnostic Test Results:  Results for orders placed or performed in visit on 10/29/19 (from the past 24 hour(s))   Hemoglobin A1c   Result Value Ref Range    Hemoglobin A1C 9.1 (H) 0 - 5.6 %           Assessment & Plan     1. Type 1 diabetes mellitus with hyperglycemia (H)  Not at goal likely due to malfunctioning pump supplies over the past month.  He states she should be getting new supplies today.  Recheck A1C in 2-3 months with lab only visit.   - Hemoglobin A1c  - Basic metabolic panel  (Ca, Cl, CO2, Creat, Gluc, K, Na, BUN)  - Albumin Random Urine Quantitative with Creat Ratio  - blood glucose (NO BRAND SPECIFIED) test strip; Use to test blood sugar 4 times daily or as directed. To accompany: Blood Glucose Monitor Brands: per insurance.  Dispense: " 400 strip; Refill: 3  - insulin aspart (NOVOLOG VIAL) 100 UNITS/ML vial; INJECT 80 UNITS UNDER THE SKIN DAILY PER INSULIN PUMP AND SLIDING SCALE AS NEEDED  Dispense: 30 mL; Refill: 3  - **A1C FUTURE 3mo; Future    2. Hyperlipidemia LDL goal <100  Chronic, controlled.  No change in treatment plan.   - LDL cholesterol direct  - Lipid panel reflex to direct LDL Fasting    3. Need for prophylactic vaccination and inoculation against influenza  - INFLUENZA VACCINE IM > 6 MONTHS VALENT IIV4 [15740]             See Patient Instructions    Return in about 3 months (around 1/29/2020) for Lab Work.    SHARMIN Smith [facility]

## 2019-10-29 NOTE — PATIENT INSTRUCTIONS
Labs will be done today.   For normal results, you will receive a letter with the results in about 2 weeks.  If anything is abnormal or unexpected, someone from the clinic will call you.      A1C should be rechecked in 2-3 months.  This can just be a lab visit.

## 2019-10-29 NOTE — TELEPHONE ENCOUNTER
"Requested Prescriptions   Pending Prescriptions Disp Refills     insulin aspart (NOVOLOG VIAL) 100 UNITS/ML vial 30 mL 0     Sig: INJECT 80 UNITS UNDER THE SKIN DAILY PER INSULIN PUMP AND SLIDING SCALE AS NEEDED - NEEDS APPOINT FOR ADDITIONAL REFILLS   Last Written Prescription Date:  10/29/19  Last Fill Quantity: 30 ml,  # refills: 3   Last office visit: 10/29/2019 with prescribing provider:  Gisella   Future Office Visit:   Next 5 appointments (look out 90 days)    Dec 05, 2019  1:10 PM CST  Office Visit with Hugh Sepulveda MD  [facility] [address] [facility] [id number] [phone]             Short Acting Insulin Protocol Failed - 10/29/2019  2:03 PM        Failed - LDL on file in past 12 months     Recent Labs   Lab Test 07/23/18  1358   *             Failed - Microalbumin on file in past 12 months     Recent Labs   Lab Test 07/23/18  1415   MICROL 10   UMALCR 3.83             Failed - Serum creatinine on file in past 12 months     Recent Labs   Lab Test 07/23/18  1358   CR 1.11             Passed - Blood pressure less than 140/90 in past 6 months     BP Readings from Last 3 Encounters:   10/29/19 116/70   02/06/19 120/84   07/23/18 100/64                 Passed - HgbA1C in past 3 or 6 months     If HgbA1C is 8 or greater, it needs to be on file within the past 3 months.  If less than 8, must be on file within the past 6 months.     Recent Labs   Lab Test 10/29/19  1336   A1C 9.1*             Passed - Medication is active on med list        Passed - Patient is age 18 or older        Passed - Recent (6 mo) or future (30 days) visit within the authorizing provider's specialty     Patient had office visit in the last 6 months or has a visit in the next 30 days with authorizing provider or within the authorizing provider's specialty.  See \"Patient Info\" tab in inbasket, or \"Choose Columns\" in Meds & Orders section of the refill encounter.              "

## 2019-10-29 NOTE — LETTER
October 30, 2019      Kashif Rodriguez  [address]  [place] [address]        Dear ,    We are writing to inform you of your test results.    Labs all look good, except his A1C is too high at 9.1.  Recheck in 2-3 months.     Electronically signed by Coretta Obando CNP.     Resulted Orders   Hemoglobin A1c   Result Value Ref Range    Hemoglobin A1C 9.1 (H) 0 - 5.6 %      Comment:      Normal <5.7% Prediabetes 5.7-6.4%  Diabetes 6.5% or higher - adopted from ADA   consensus guidelines.     LDL cholesterol direct   Result Value Ref Range    LDL Cholesterol Direct 82 <100 mg/dL      Comment:      Desirable:       <100 mg/dl   Basic metabolic panel  (Ca, Cl, CO2, Creat, Gluc, K, Na, BUN)   Result Value Ref Range    Sodium 138 133 - 144 mmol/L    Potassium 4.2 3.4 - 5.3 mmol/L    Chloride 107 94 - 109 mmol/L    Carbon Dioxide 27 20 - 32 mmol/L    Anion Gap 4 3 - 14 mmol/L    Glucose 232 (H) 70 - 99 mg/dL    Urea Nitrogen 11 7 - 30 mg/dL    Creatinine 0.93 0.66 - 1.25 mg/dL    GFR Estimate >90 >60 mL/min/[1.73_m2]      Comment:      Non  GFR Calc  Starting 12/18/2018, serum creatinine based estimated GFR (eGFR) will be   calculated using the Chronic Kidney Disease Epidemiology Collaboration   (CKD-EPI) equation.      GFR Estimate If Black >90 >60 mL/min/[1.73_m2]      Comment:       GFR Calc  Starting 12/18/2018, serum creatinine based estimated GFR (eGFR) will be   calculated using the Chronic Kidney Disease Epidemiology Collaboration   (CKD-EPI) equation.      Calcium 8.8 8.5 - 10.1 mg/dL   Albumin Random Urine Quantitative with Creat Ratio   Result Value Ref Range    Creatinine Urine 181 mg/dL    Albumin Urine mg/L 12 mg/L    Albumin Urine mg/g Cr 6.63 0 - 17 mg/g Cr   Lipid panel reflex to direct LDL Fasting   Result Value Ref Range    Cholesterol 150 <200 mg/dL    Triglycerides 50 <150 mg/dL    HDL Cholesterol 51 >39 mg/dL    LDL Cholesterol Calculated 89 <100  mg/dL      Comment:      Desirable:       <100 mg/dl    Non HDL Cholesterol 99 <130 mg/dL       If you have any questions or concerns, please call the clinic at the number listed above.       Sincerely,        SHARMIN Smith CNP

## 2019-10-29 NOTE — TELEPHONE ENCOUNTER
Prescription was sent 10/29/19 for #30ml with 3 refills.  Confirmed receipt of refill with pharmacy.  Pharmacy notified via E-Prescribe refusal.     Lyssa Cope RN on 10/29/2019 at 2:41 PM

## 2019-11-27 ENCOUNTER — DOCUMENTATION ONLY (OUTPATIENT)
Dept: FAMILY MEDICINE | Facility: OTHER | Age: 29
End: 2019-11-27

## 2019-11-27 ENCOUNTER — TELEPHONE (OUTPATIENT)
Dept: FAMILY MEDICINE | Facility: OTHER | Age: 29
End: 2019-11-27

## 2019-11-27 NOTE — TELEPHONE ENCOUNTER
Dressing to biopsy site dry and intact - no edema at site. VSS. Denies pain  Oxygen decreased to 2 liters per nc. Reason for Call:  Other appointment    Detailed comments: Kashif is suppose to schedule an appointment with MTM, see encounter note from RN on 11/27/19, due to his work schedule and  availability he is unable to come to see you when you are in clinic. Would it be possible to do a telephone visit for Kashif? Please notify him if this is possible.     Phone Number Patient can be reached at: Home number on file [phone] (home)    Best Time:     Can we leave a detailed message on this number? Yes, if mailbox isn't full    Call taken on 11/27/2019 at 3:59 PM by Prachi Silverman

## 2019-11-27 NOTE — PROGRESS NOTES
Diabetic Review Team Follow up  Gaps identified on last contact:  Financial. Patient needed to see MTM. Delay in refills of insulin  Plan from last contact:  Patient get A1C drawn -done. MTM apt -not done  Lab Results   Component Value Date    A1C 9.1 10/29/2019    A1C 8.5 08/23/2019    A1C 8.1 02/06/2019    A1C 8.4 11/12/2018    A1C 8.6 07/23/2018     Progress: A1C worse  New Gaps Identified: no eye exam in last 12 months, financial burden of diabetes management, forgets to take medications 1 x per week, malfunctioning pump (got new supplies 10/29/19).  New/Continuing plan:  F/u A1C 1/29/20. Sent staff message to scheduling to schedule MTM appt.  RN will call patient to follow up on pump issues. -writer called patient who reports having some difficulty with the pump. Had to replace the sensor 3 x in one day due to malfunction but hasn't had issues since. Patient checking BG 3-4 x per day when sensor is on. BG has been improved per patient. Patient declined appt with Diabetic educator as he cannot afford that but will call the clinic if he encounters further pump malfunctions. Patient does have an appt with MTM next week.    Tawana Siddiqi RN,BSN  Clinical Care Coordinator      Rita Julien RDN, LD, CDE  Diabetic     Janice Omalley RN  [facility] Nurse    Marguerite Padilla RN  Specialty Clinic          
supervision

## 2019-11-27 NOTE — TELEPHONE ENCOUNTER
Called pt back and scheduled phone MTM visit on 12/11.    Carmen Reese, PharmD  Medication Therapy Management Pharmacist  Pager: [phone]

## 2019-12-05 ENCOUNTER — OFFICE VISIT (OUTPATIENT)
Dept: FAMILY MEDICINE | Facility: OTHER | Age: 29
End: 2019-12-05
Payer: COMMERCIAL

## 2019-12-05 VITALS
WEIGHT: 224.13 LBS | RESPIRATION RATE: 14 BRPM | BODY MASS INDEX: 31.08 KG/M2 | DIASTOLIC BLOOD PRESSURE: 70 MMHG | HEART RATE: 94 BPM | TEMPERATURE: 97.2 F | OXYGEN SATURATION: 99 % | SYSTOLIC BLOOD PRESSURE: 100 MMHG

## 2019-12-05 DIAGNOSIS — Z30.09 ENCOUNTER FOR VASECTOMY COUNSELING: Primary | ICD-10-CM

## 2019-12-05 PROCEDURE — 99214 OFFICE O/P EST MOD 30 MIN: CPT | Performed by: FAMILY MEDICINE

## 2019-12-05 RX ORDER — DIAZEPAM 10 MG
10 TABLET ORAL EVERY 6 HOURS PRN
Qty: 1 TABLET | Refills: 0 | Status: SHIPPED | OUTPATIENT
Start: 2019-12-05 | End: 2020-05-05

## 2019-12-05 ASSESSMENT — PAIN SCALES - GENERAL: PAINLEVEL: NO PAIN (0)

## 2019-12-05 NOTE — PROGRESS NOTES
Kashif is a 29 year old male being seen today for a vasectomy consult. He has considered a vasectomy for 2-3 years.  He has been  for many years, no marital problems, and no sexual dysfunction. He has 4 children.  Current method of birth control is OCP.      Any history of Epididymitis No  Hernia/surgery No  Trauma No  STD's  No  Prostatis No  UTI's  No  Testicular lumps  No    Past Medical History:   Diagnosis Date     Hyperlipidemia LDL goal <100 1/17/2017         Current Outpatient Medications:      ACE NOT PRESCRIBED, INTENTIONAL,, by Other route continuous prn., Disp: , Rfl: 0     alcohol swab prep pads, Use to swab area of injection/erwin as directed., Disp: 400 each, Rfl: 3     blood glucose (NO BRAND SPECIFIED) test strip, Use to test blood sugar 4 times daily or as directed. To accompany: Blood Glucose Monitor Brands: per insurance., Disp: 400 strip, Rfl: 3     blood glucose calibration (NO BRAND SPECIFIED) solution, To accompany: Blood Glucose Monitor Brands: per insurance., Disp: 1 Bottle, Rfl: 3     blood glucose monitoring (NO BRAND SPECIFIED) meter device kit, Use to test blood sugar 4 times daily or as directed. : Blood Glucose Monitor Brands: per insurance., Disp: 1 kit, Rfl: 0     insulin aspart (NOVOLOG VIAL) 100 UNITS/ML vial, INJECT 80 UNITS UNDER THE SKIN DAILY PER INSULIN PUMP AND SLIDING SCALE AS NEEDED, Disp: 30 mL, Rfl: 3     INSULIN PUMP - OUTPATIENT, Date last updated:  2/8/17 [employer] Minimed: Model 530G BASAL RATES : 12   AM (midnight): 1.1 units/hour , 5AM  1.2, 10AM 2.0,  8PM 1.3 CARB RATIO: 12   AM (midnight): 10 grams Corection Factor (Sensitivity): 12   AM (midnight): 40 mg/dL BLOOD GLUCOSE TARGET: 12   AM (midnight): 90 - 100 1     AM:  100 - 120 10   AM:  90 - 100 Active Insulin Time:  4 hours Sensor:  Yes: SENSOR GLUCOSE LIMITS: 12   AM (midnight): 70 - 300, Disp: 1 each, Rfl: 0     loratadine (CLARITIN) 10 MG tablet, Take 1 tablet (10 mg) by mouth daily, Disp: 30  tablet, Rfl: 1     STATIN NOT PRESCRIBED, INTENTIONAL,, Statin not prescribed intentionally due to Not indicated based on age, Disp: , Rfl:      thin (NO BRAND SPECIFIED) lancets, Use with lanceting device. To accompany: Blood Glucose Monitor Brands: per insurance., Disp: 400 each, Rfl: 3     aspirin 81 MG chewable tablet, Take 1 tablet (81 mg) by mouth daily, Disp: , Rfl:      glucagon (GLUCAGON EMERGENCY) 1 MG kit, Inject 1 mg into the muscle once for 1 dose, Disp: 1 mg, Rfl: 0    No Known Allergies    Social History     Socioeconomic History     Marital status:      Spouse name: Not on file     Number of children: Not on file     Years of education: Not on file     Highest education level: Not on file   Occupational History     Not on file   Social Needs     Financial resource strain: Not on file     Food insecurity:     Worry: Not on file     Inability: Not on file     Transportation needs:     Medical: Not on file     Non-medical: Not on file   Tobacco Use     Smoking status: Passive Smoke Exposure - Never Smoker     Smokeless tobacco: Never Used     Tobacco comment: works at [employer]    Substance and Sexual Activity     Alcohol use: No     Drug use: No     Sexual activity: Yes   Lifestyle     Physical activity:     Days per week: Not on file     Minutes per session: Not on file     Stress: Not on file   Relationships     Social connections:     Talks on phone: Not on file     Gets together: Not on file     Attends Hinduism service: Not on file     Active member of club or organization: Not on file     Attends meetings of clubs or organizations: Not on file     Relationship status: Not on file     Intimate partner violence:     Fear of current or ex partner: Not on file     Emotionally abused: Not on file     Physically abused: Not on file     Forced sexual activity: Not on file   Other Topics Concern     Parent/sibling w/ CABG, MI or angioplasty before 65F 55M? No   Social History Narrative     Not on  file       Exam:  /70 (BP Location: Right arm, Patient Position: Chair, Cuff Size: Adult Large)   Pulse 94   Temp 97.2  F (36.2  C) (Temporal)   Resp 14   Wt 101.7 kg (224 lb 2 oz)   SpO2 99%   BMI 31.08 kg/m    General: Healthy appearing male in NAD.  : Penis normal.  Testes descended bilaterally.  No testicular masses. No hernias.  Both vas deferens are palpable.      ASSESSMENT:  Vasectomy for Undesired fertility.    PLAN:  General no scaple vasectomy procedure was explained in detail.  He realizes there is the risk of bleeding and infection, as well as postoperative discomfort for up to three months, development of sperm granulomas or epididymal cysts, and the possibility of failure to produce desired sterility of 1 in 1000.  He knows that he has to bring in a post vas sample after eight to twelve weeks and twelve to fifteen ejaculations, and it is recommended that he bring in another specimen one year after procedure to ensure sterility.  He needs a post vas semen analysis that shows no sperm before he can be considered sterile.  He knows that he will need to use another form of birth control until then. He understands that he should be taking it easy over the next several days with no heavy lifting, strenuous activity or sexual intercourse for one week after the procedure.  Vasectomy and post vas instruction sheets were made available to him and he had an opportunity to review these.  He had no further question. He realizes that this is meant to be a permanent procedure.  He will schedule for a vasectomy at his convenience.  If he has questions in the meantime, he will call me.  Consent form was reviewed and signed.      Meds:  Given prescription for Valium 10 mg to take 1hr prior to procedure.

## 2019-12-05 NOTE — PATIENT INSTRUCTIONS
Patient Education     Understanding Vasectomy  Vasectomy is a simple, safe procedure that makes a man sterile (unable to father a child). It is the most effective birth control method for men.  Your reproductive system  For pregnancy to occur, a man s sperm (male reproductive cells) must join with a woman s egg. To understand how a vasectomy works, you need to know how sperm are produced, stored, and released by the body:    The urethra is the tube in the center of the penis. It transports both urine and semen. When you have an orgasm, semen is ejaculated out of the urethra.    The seminal vesicles and the prostate gland secrete fluids called semen. This sticky, white fluid helps nourish sperm and carry them along.    The epididymis is a coiled tube that holds the sperm while they mature.    The scrotum is a pouch of skin that contains the testes.    The testes are glands that produce sperm and male hormones.    The vas deferens are tubes that carry the sperm from the epididymis to the penis.    Sperm (shown magnified) carry genetic material.     How a vasectomy works  During the procedure, the 2 vas deferens are cut and sealed off. This prevents sperm from traveling from the testes to the penis. It is the only change in your reproductive system. The testes still produce sperm. But since the sperm have nowhere to go, they die and are absorbed by your body. Only a very small amount of semen is made up of sperm. So after a vasectomy, your semen won t look or feel any different.  Keep in mind  After a vasectomy, some active sperm still remain in the reproductive system. It will take about 3 months and numerous ejaculations before the semen is completely free of sperm. Until then, you ll need to use another form of birth control.   Date Last Reviewed: 1/1/2017 2000-2018 [employer]. [address] [place] [address], [place] [address]. All rights reserved. This information is not intended as a substitute for  professional medical care. Always follow your healthcare professional's instructions.           Patient Education     Vasectomy: Risks and Complications  A vasectomy is an outpatient procedure. This means you ll go home the same day. It s done in a doctor s office, clinic, or hospital. Before your procedure, you ll be asked to read and sign a consent form. This form states you re aware of the possible risks and complications. It also says that you understand that the procedure, though most often successful, can t promise to make you sterile. Be sure you have all your questions answered before you sign this form. Below is a list of risks and possible complications of the procedure.  Risks and possible complications of vasectomy  Vasectomy is safe. But it does have risks. They include the following:    Bleeding or infection    Sperm granuloma. This is a small, harmless lump. It may form where the vas deferens is sealed off.    Sperm buildup (congestion). This may cause soreness in the testes. Anti-inflammatory medicine can provide relief.    Epididymitis. This is inflammation that may cause scrotal aching. It often goes away without treatment. Anti-inflammatory medicine can provide relief.    Reconnection of the vas deferens. This can occur in rare cases. It makes you fertile again. This may result in an unplanned pregnancy.    Sperm antibodies. Developing antibodies is a common response of your body to the absorbed sperm. The antibodies can make you sterile. This is true even if you later try to reverse your vasectomy.    Long-term testicular discomfort. This may occur after surgery. But it s very rare.   Date Last Reviewed: 1/1/2017 2000-2018 [employer]. [address] [place] [address], [place] [address]. All rights reserved. This information is not intended as a substitute for professional medical care. Always follow your healthcare professional's instructions.           Patient Education     No-Scalpel  Vasectomy  Talk to your healthcare provider about how to prepare for the procedure, and about possible risks and complications.      Front view of penis and scrotum showing vas deferens during and after vasectomy.     The no-scalpel procedure is similar to a traditional vasectomy in many respects, but it s done without incisions or stitches. This generally results in faster healing.  During the procedure    You re asked to undress and lie on the exam table. Sterile drapes are placed over you to help prevent infection.    You re given injections of anesthetic into your scrotum or lower groin to prevent you from feeling pain.    Once the anesthetic takes effect, the doctor makes one puncture in the scrotum with a pointed clamp. One at a time, the vasa deferentia are lifted through this puncture.    The vasa are cut, and a section of each may be removed. You may feel a pulling sensation during this process.    The 2 cut ends are sealed by heat (cauterized) and may also be tied or clipped. The puncture heals naturally without stitches.  After the procedure  If you ve been given medicine to help you relax, you ll need to have someone drive you home. The local anesthetic starts to wear off after an hour or so. Any discomfort you feel is usually very mild. If you need it, a pain reliever may help.  During your healing  Recovery time after a no-scalpel vasectomy is usually less than after a traditional vasectomy. Once you re home, you can do several things to aid your recovery:    Stay off your feet as much as possible for the first day to lessen the chance of swelling. An ice pack can also help keep swelling down.    Wear an athletic support or snug cotton briefs for support.    Avoid heavy lifting or exercise for at least 5 days.    Ask your doctor when you can return to work.    Ask your doctor when you can start having sex again. Note: You must use some form of birth control until your doctor says you re sterile.   Date  Last Reviewed: 1/1/2017 2000-2018 [employer]. [address] [place] [address], [place] [address]. All rights reserved. This information is not intended as a substitute for professional medical care. Always follow your healthcare professional's instructions.           Patient Education     Having a Vasectomy: Before, During, and After the Procedure     The cut ends of the vas may be tied, closed with a clip, or sealed by heat (cauterized).   Vasectomy is an outpatient procedure. This means you can go home the same day. It can be done in a doctor s office, clinic, or hospital. Your doctor will talk with you about how to get ready for surgery. He or she will also discuss the possible risks and complications with you. After the procedure, follow your doctor s advice for recovery.  Getting ready for surgery  Your doctor will talk with you about getting ready for surgery. You may be asked to do the following:    Sign a consent form. This must be done at least a few days before surgery. It gives your doctor permission to do the procedure. It also states that a vasectomy is not guaranteed to make you sterile.    Don t take aspirin, ibuprofen, or naproxen for 2 weeks before surgery. These medicines can cause bleeding after the procedure. Also, tell your doctor if you take any medicines, supplements, or herbal remedies.    Tell your doctor if you ve had any scrotal surgery in the past.    Arrange for an adult family member or friend to give you a ride home after surgery.    Shower and clean your scrotum the day of surgery. Your doctor may also ask you to shave your scrotum.    Bring a jock strap (athletic supporter) or pair of snug cotton briefs to the doctor s office or hospital.    Eat no more than a light snack before surgery.  During surgery  The entire procedure usually lasts less than 30 minutes.    You ll be asked to undress and lie on a table.    You may be given medicine to help you relax. To prevent pain  during surgery, you ll be given an injection of pain medicine in your scrotum or lower groin.    Once the area is numb, the doctor makes one or two small incisions in the scrotum. This may be done with a scalpel or with a pointed clamp (no-scalpel method).    The vas deferens are lifted through the incision and cut. The provider seals off the ends of the vas deferens using one of several methods.    If needed, the incision is closed with stitches.    You can rest for a while until you re ready to go home.  Recovering at home  For about a week, your scrotum may look bruised and slightly swollen. You may also have a small amount of bloody discharge from the incision. This is normal.  To help make your recovery more comfortable, follow the tips below.    Stay off your feet as much as possible for the first 2 days. Try to lie flat on a bed or sofa.    Wear an athletic supporter or snug cotton briefs for support.    Reduce swelling by using an ice pack or bag of frozen peas wrapped in a thin towel. Put the ice pack or towel on your scrotum.    Take medicines with acetaminophen to relieve any discomfort. Don t use aspirin, ibuprofen, or naproxen.    Wait 48 hours before bathing.    Avoid heavy lifting or exercise for 7 days.    Ask your doctor how long to wait before having sex again. Remember: You must use another form of birth control until you re completely sterile.  When to seek medical care  Call your doctor if you notice any of the following after surgery:    Increasing pain or swelling in your scrotum    A large black-and-blue area, or a growing lump    Fever or chills    Increasing redness or drainage of the incision    Trouble urinating   Sex after vasectomy  Vasectomy doesn t change your sexual function. So when you start having sex again, it should feel the same as before. A vasectomy also shouldn t affect your relationship with your partner. It s important to remember, though, that you won t become sterile  right away. It will take time before you can have sex without the need for birth control.    Until you re sterile: After a vasectomy, some active sperm still remain in your semen. It will take time and many ejaculations before the sperm are completely gone. During this period, you must use another birth control method to prevent pregnancy. To make sure no sperm are left in your semen, you ll need to have one or more semen exams. You usually collect a semen sample at home and bring it to a lab. The sample is then checked under a microscope. You re sterile only when these samples show no evidence of sperm. Ask your doctor whether additional follow-up is needed.    After you re sterile: After your doctor tells you you re sterile, you no longer need to use any form of birth control. You re free to have sex without the fear of unwanted pregnancy. But a vasectomy does not protect you from sexually transmitted diseases (STDs). If you have more than one sex partner, be sure to practice safer sex by using condoms.  Date Last Reviewed: 1/1/2017 2000-2018 [employer]. [address] [place] [address], [place] [address]. All rights reserved. This information is not intended as a substitute for professional medical care. Always follow your healthcare professional's instructions.

## 2019-12-11 ENCOUNTER — ALLIED HEALTH/NURSE VISIT (OUTPATIENT)
Dept: PHARMACY | Facility: OTHER | Age: 29
End: 2019-12-11
Payer: COMMERCIAL

## 2019-12-11 DIAGNOSIS — J30.9 ALLERGIC RHINITIS, UNSPECIFIED SEASONALITY, UNSPECIFIED TRIGGER: ICD-10-CM

## 2019-12-11 DIAGNOSIS — E10.9 TYPE 1 DIABETES MELLITUS WITHOUT COMPLICATION (H): Primary | ICD-10-CM

## 2019-12-11 PROCEDURE — 99605 MTMS BY PHARM NP 15 MIN: CPT | Performed by: PHARMACIST

## 2019-12-11 PROCEDURE — 99607 MTMS BY PHARM ADDL 15 MIN: CPT | Performed by: PHARMACIST

## 2019-12-11 NOTE — PROGRESS NOTES
"SUBJECTIVE/OBJECTIVE:                           Kashif Rodriguez is a 29 year old male called for an initial visit for Medication Therapy Management.  He was referred to me from SHARMIN Robles CNP.    Chief Complaint: Uncontrolled diabetes.    Allergies/ADRs: Reviewed in Epic  Tobacco: none  Alcohol: not currently using  Caffeine: 2 sugar-free energy drinks/day  Activity: Lots of walking, daily household chores.   PMH: Reviewed in Epic    Medication Adherence/Access:  Issues reported - Pt states that last time he saw CDE in 2017, it cost him $600. He is switching insurance to [employer] next year and thinks diabetes care will be covered better. Pt reports insulin vials are affordable because he uses a savings card.     Diabetes:  Pt currently taking Novolog via pump, averaging 37 units basal plus additional 40-50 units for bolus per day. Has a [employer] Minimed 670G. Yesterday had a port leak and didn't have an extra infusion set with him. Pt reports that blood sugars are typically under good control unless pump equipment malfunctions or if needs insulin refills. Pt is not experiencing side effects.   Basal rates: midnight-5am 1.1 units/hr, 5am-10am 1.2 units/hr, 10am-8pm 2 units/hr, 8pm-midnight 1.3 units/hr. Carb ratio 8 grams/unit. Sensitivity 32.   SMBG ranges (patient reported):  fasting, 200 post-prandial.   Patient is experiencing hypoglycemia. Frequency of hypoglycemia? Every 2 weeks, caused by overcompensating after port change.  Recent symptoms of high blood sugar? none  Eye exam: due  Foot exam: due  ACEi/ARB: Not taking.   Urine Albumin:   Lab Results   Component Value Date    UMALCR 6.63 10/29/2019   Aspirin: Not taking due to age <50.   Diet/Exercise: \"Poor\" due to fast food and junk food.    Lab Results   Component Value Date    A1C 9.1 10/29/2019    A1C 8.5 08/23/2019    A1C 8.1 02/06/2019    A1C 8.4 11/12/2018    A1C 8.6 07/23/2018     Allergic rhinitis: Current medications include " loratadine 10mg every other daily. Primary symptoms are nasal congestion and cough. Primary triggers are winter. Pt feels that current therapy is effective.     Today's Vitals: None taken (telemed)    ASSESSMENT:                            Medication Adherence: poor, needs improvement - see below.     Diabetes: Needs Improvement. Patient is not meeting A1c goal of < 7%. Pt currently has everything he needs and pump equipment is functioning properly. Educated pt on importance of having a back-up infusion set available and requesting insulin refills proactively. I called his pharmacy and he has 3 refills remaining. Pt would benefit from seeing diabetes education for pump management.     Allergic rhinitis: Stable.     PLAN:                            1. Placed CDE referral.   2. Continue current medications.    I spent 30 minutes with this patient today. All changes were made via collaborative practice agreement with SHARMIN Robles CNP. A copy of the visit note was provided to the patient's primary care provider.    Will follow up with CDE after the new year    The patient declined a summary of these recommendations as an after visit summary.     Cramen Reese, PharmD  Medication Therapy Management Pharmacist  Pager: [phone]

## 2019-12-12 ENCOUNTER — TELEPHONE (OUTPATIENT)
Dept: FAMILY MEDICINE | Facility: OTHER | Age: 29
End: 2019-12-12

## 2019-12-12 NOTE — TELEPHONE ENCOUNTER
Diabetes Education Scheduling Outreach #1:    Call to patient to schedule. Left message with phone number to call to schedule.    Plan for 2nd outreach attempt within 1 week.    Clover Herrera  [facility]  Diabetes and Nutrition Scheduling

## 2019-12-19 NOTE — TELEPHONE ENCOUNTER
Diabetes Education Scheduling Outreach #2:    Call to patient to schedule. Voicemail full.    [facility]  Diabetes and Nutrition Scheduling

## 2020-01-16 ENCOUNTER — TELEPHONE (OUTPATIENT)
Dept: FAMILY MEDICINE | Facility: OTHER | Age: 30
End: 2020-01-16

## 2020-01-16 NOTE — TELEPHONE ENCOUNTER
Spoke with [employer] and they scanned in the fax we sent on 1/15/20 2 hours after the initial phone call. Miracle Ely LPN........1/16/2020 2:03 PM

## 2020-01-16 NOTE — TELEPHONE ENCOUNTER
Airam from [employer] calling. They need a new certificate of medical necessity for his DM testing supplies. Please call her at [phone]. She states this form was faxed on 1/14/19. They would like to know the status of this.     Thank you,  Judith Pritchett- Patient Representative

## 2020-01-22 NOTE — TELEPHONE ENCOUNTER
I called pt to follow-up. He reports he ran out of pump supplies 3 days ago and blood sugars are running in the 200s. CMN form is already in place. I advised him to call [employer] right now to expedite the process. Routing to PCP as ELEANOR Reese, PharmD  Medication Therapy Management Pharmacist  Pager: [phone]

## 2020-02-03 ENCOUNTER — TELEPHONE (OUTPATIENT)
Dept: PHARMACY | Facility: OTHER | Age: 30
End: 2020-02-03

## 2020-02-03 NOTE — TELEPHONE ENCOUNTER
I called pt to follow-up. Left voicemail to call me back.    Carmen Reese, PharmD  Medication Therapy Management Pharmacist  Pager: [phone]

## 2020-02-18 ENCOUNTER — TELEPHONE (OUTPATIENT)
Dept: FAMILY MEDICINE | Facility: OTHER | Age: 30
End: 2020-02-18

## 2020-02-18 DIAGNOSIS — Z20.828 EXPOSURE TO THE FLU: Primary | ICD-10-CM

## 2020-02-18 RX ORDER — OSELTAMIVIR PHOSPHATE 75 MG/1
75 CAPSULE ORAL DAILY
Qty: 7 CAPSULE | Refills: 0 | Status: SHIPPED | OUTPATIENT
Start: 2020-02-18 | End: 2020-05-05

## 2020-02-18 NOTE — TELEPHONE ENCOUNTER
"Patient requesting  medication for influenza exposure to [employer] in [place], spouse and children tested positive for influenza. Patient weight 227 lb's, 5' 2\". No allergies.   "

## 2020-02-26 NOTE — LETTER
Kashif Rodriguez  [address] [facility] [address]    January 15, 2018      Dear Kashif Rodriguez    APPOINTMENT REMINDER:   Our records indicates that it is time for you to be seen for a recheck.     Your current medication request will be approved for one refill but you will need to be seen before any additional refills can be approved.  Taking care of your health is important to us, and ongoing visits with your provider are vital to your care.    We look forward to seeing you in the near future.  You may call our office at [phone] to schedule a visit.     Please disregard this notice if you have already made an appointment.      Sincerely,    [facility]  [id number] [place] [id number]  Phone: [phone]   of D&C     Hypertension     Lung nodule     Reflux esophagitis      Past Surgical History:   Procedure Laterality Date     SECTION      x 3    DIAGNOSTIC CARDIAC CATH LAB PROCEDURE      GALLBLADDER SURGERY      UPPER GASTROINTESTINAL ENDOSCOPY       Current Outpatient Medications   Medication Sig Dispense Refill    aspirin 81 MG tablet Take 81 mg by mouth daily      atorvastatin (LIPITOR) 20 MG tablet Take 20 mg by mouth daily      diazePAM (VALIUM) 10 MG tablet Take 10 mg by mouth every 8 hours as needed for Anxiety.  HYDROcodone-acetaminophen (NORCO)  MG per tablet Take 1 tablet by mouth every 6 hours as needed for Pain.  isosorbide mononitrate (IMDUR) 30 MG extended release tablet Take 30 mg by mouth daily      magnesium oxide (MAG-OX) 400 MG tablet Take 400 mg by mouth 2 times daily      metoprolol tartrate (LOPRESSOR) 25 MG tablet Take 25 mg by mouth 2 times daily      naloxone (NARCAN) 4 MG/0.1ML LIQD nasal spray 1 spray by Nasal route as needed for Opioid Reversal      pantoprazole (PROTONIX) 40 MG tablet Take 40 mg by mouth daily      Ketorolac Trometh & Lidocaine (KETOROCAINE-L) 30 & 1 MG/ML-% KIT Inject as directed      loratadine (CLARITIN) 10 MG capsule Take 10 mg by mouth daily      metoprolol tartrate (LOPRESSOR) 50 MG tablet Take 50 mg by mouth 2 times daily       No current facility-administered medications for this visit. Allergies   Allergen Reactions    Plavix [Clopidogrel Bisulfate]      History reviewed. No pertinent family history.   Social History     Socioeconomic History    Marital status:      Spouse name: Not on file    Number of children: Not on file    Years of education: Not on file    Highest education level: Not on file   Occupational History    Not on file   Social Needs    Financial resource strain: Not on file    Food insecurity:     Worry: Not on file     Inability: Not on file    Transportation needs:     Medical: Not

## 2020-03-04 ENCOUNTER — DOCUMENTATION ONLY (OUTPATIENT)
Dept: FAMILY MEDICINE | Facility: OTHER | Age: 30
End: 2020-03-04

## 2020-03-04 NOTE — PROGRESS NOTES
Diabetic Review Team Follow up  Gaps identified on last contact:  no eye exam in last 12 months, financial burden of diabetes management, forgets to take medications 1 x per week, malfunctioning pump (got new supplies 10/29/19).  Plan from last contact:  F/u A1C 1/29/20. Sent staff message to scheduling to schedule MTM appt.  RN will call patient to follow up on pump issues.  Progress: Saw MTM on 12/11/19.   Lab Results   Component Value Date    A1C 9.1 10/29/2019    A1C 8.5 08/23/2019    A1C 8.1 02/06/2019    A1C 8.4 11/12/2018    A1C 8.6 07/23/2018     New Gaps Identified: Supposed to follow-up with CDE after new year, but did not schedule.   New/Continuing plan:  [facility] to reach out to/follow-up with patient.    Next Review Date/Other Notes 3 months    Tawana Siddiqi RN,BSN  Clinical Care Coordinator      Rita Julien RDN, LD, CDE  Diabetic     Zack Marrero PharmD  MTM pharmacist    Janice Omalley RN  [facility] Nurse    Tamar Morocho [facility]  Behavioral Health Clinician

## 2020-03-30 DIAGNOSIS — E10.65 TYPE 1 DIABETES MELLITUS WITH HYPERGLYCEMIA (H): ICD-10-CM

## 2020-03-30 NOTE — TELEPHONE ENCOUNTER
"Requested Prescriptions   Pending Prescriptions Disp Refills     insulin aspart (NOVOLOG VIAL) 100 UNITS/ML vial [Pharmacy Med Name: INSULIN ASPART 100UNIT/ML INJ] 30 mL 3     Sig: INJECT 80 UNITS UNDER THE SKIN DAILY PER INSULIN PUMP AND SLIDING SCALE AS NEEDED   Last Written Prescription Date:  10/29/19  Last Fill Quantity: 30 mL,  # refills: 3   Last office visit: 12/5/2019 with prescribing provider:  10/29/19   Future Office Visit:        Short Acting Insulin Protocol Failed - 3/30/2020 12:53 PM        Failed - HgbA1C in past 3 or 6 months     If HgbA1C is 8 or greater, it needs to be on file within the past 3 months.  If less than 8, must be on file within the past 6 months.     Recent Labs   Lab Test 10/29/19  1336   A1C 9.1*             Passed - Serum creatinine on file in past 12 months     Recent Labs   Lab Test 10/29/19  1336   CR 0.93       Ok to refill medication if creatinine is low          Passed - Medication is active on med list        Passed - Patient is age 18 or older        Passed - Recent (6 mo) or future (30 days) visit within the authorizing provider's specialty     Patient had office visit in the last 6 months or has a visit in the next 30 days with authorizing provider or within the authorizing provider's specialty.  See \"Patient Info\" tab in inbasket, or \"Choose Columns\" in Meds & Orders section of the refill encounter.               "

## 2020-03-30 NOTE — TELEPHONE ENCOUNTER
Routing refill request to provider for review/approval because:  Labs not current:  A1C    YVONNE TangN, RN  [facility]

## 2020-04-20 ENCOUNTER — TELEPHONE (OUTPATIENT)
Dept: PHARMACY | Facility: OTHER | Age: 30
End: 2020-04-20

## 2020-04-20 DIAGNOSIS — E10.65 TYPE 1 DIABETES MELLITUS WITH HYPERGLYCEMIA (H): ICD-10-CM

## 2020-04-20 NOTE — TELEPHONE ENCOUNTER
I called pt to check in. Encouraged him to scheduled virtual or phone appts with PCP and CDE. Pt needs more Novolog vial for his pump and has 1 week left. I sent refill to pharmacy so he has enough supply to get by until appts.    Carmen Reese, PharmD  Medication Therapy Management Pharmacist  Pager: [phone]

## 2020-05-05 ENCOUNTER — VIRTUAL VISIT (OUTPATIENT)
Dept: FAMILY MEDICINE | Facility: CLINIC | Age: 30
End: 2020-05-05
Payer: COMMERCIAL

## 2020-05-05 DIAGNOSIS — E10.65 TYPE 1 DIABETES MELLITUS WITH HYPERGLYCEMIA (H): ICD-10-CM

## 2020-05-05 PROCEDURE — 99214 OFFICE O/P EST MOD 30 MIN: CPT | Mod: 95 | Performed by: NURSE PRACTITIONER

## 2020-05-05 ASSESSMENT — PAIN SCALES - GENERAL: PAINLEVEL: NO PAIN (0)

## 2020-05-05 NOTE — PROGRESS NOTES
"Kashif Rodriguez is a 29 year old male who is being evaluated via a billable video visit.      The patient has been notified of following:     \"This video visit will be conducted via a call between you and your physician/provider. We have found that certain health care needs can be provided without the need for an in-person physical exam.  This service lets us provide the care you need with a video conversation.  If a prescription is necessary we can send it directly to your pharmacy.  If lab work is needed we can place an order for that and you can then stop by our lab to have the test done at a later time.    Video visits are billed at different rates depending on your insurance coverage.  Please reach out to your insurance provider with any questions.    If during the course of the call the physician/provider feels a video visit is not appropriate, you will not be charged for this service.\"    Patient has given verbal consent for Video visit? Yes    How would you like to obtain your AVS? Mail a copy    Patient would like the video invitation sent by: Text to cell phone: [phone]    Will anyone else be joining your video visit? No      Subjective     Kashif Rodriguez is a 29 year old male who presents to clinic today for the following health issues:    HPI  Diabetes Follow-up    How often are you checking your blood sugar? Two times daily  Blood sugar testing frequency justification:  Uncontrolled diabetes  What time of day are you checking your blood sugars (select all that apply)?  Before meals  Have you had any blood sugars above 200?  Yes once a week   Have you had any blood sugars below 70?  Yes about once a month     What symptoms do you notice when your blood sugar is low?  Shaky, Dizzy, Weak    What concerns do you have today about your diabetes? None     Do you have any of these symptoms? (Select all that apply)  No numbness or tingling in feet.  No redness, sores or blisters on feet.  No " "complaints of excessive thirst.  No reports of blurry vision.  No significant changes to weight.    Have you had a diabetic eye exam in the last 12 months? No        BP Readings from Last 2 Encounters:   12/05/19 100/70   10/29/19 116/70     Hemoglobin A1C (%)   Date Value   10/29/2019 9.1 (H)   08/23/2019 8.5 (H)     LDL Cholesterol Calculated (mg/dL)   Date Value   10/29/2019 89   07/23/2018 101 (H)     LDL Cholesterol Direct (mg/dL)   Date Value   10/29/2019 82         How many servings of fruits and vegetables do you eat daily?  0-1    On average, how many sweetened beverages do you drink each day (Examples: soda, juice, sweet tea, etc.  Do NOT count diet or artificially sweetened beverages)?   2-3    How many days per week do you exercise enough to make your heart beat faster? 5    How many minutes a day do you exercise enough to make your heart beat faster? 30 - 60    How many days per week do you miss taking your medication? 0      Video Start Time: 0930      Review of Systems   ROS COMP: Constitutional, HEENT, cardiovascular, pulmonary, gi and gu systems are negative, except as otherwise noted.      Objective    There were no vitals taken for this visit.  Estimated body mass index is 31.08 kg/m  as calculated from the following:    Height as of 10/29/19: 1.808 m (5' 11.2\").    Weight as of 12/5/19: 101.7 kg (224 lb 2 oz).  Physical Exam     GENERAL: healthy, alert and no distress  EYES: Eyes grossly normal to inspection, conjunctivae and sclerae normal  RESP: no audible wheeze, cough, or visible cyanosis.  No visible retractions or increased work of breathing.  Able to speak fully in complete sentences.  NEURO: Cranial nerves grossly intact, mentation intact and speech normal  PSYCH: mentation appears normal, affect normal/bright, judgement and insight intact, normal speech and appearance well-groomed      Diagnostic Test Results:  Pending         Assessment & Plan     1. Type 1 diabetes mellitus with " hyperglycemia (H)  Due for A1C. He will schedule a lab only visit for this.   - HEMOGLOBIN A1C; Future  - insulin aspart (NOVOLOG VIAL) 100 UNITS/ML vial; Inject 80 Units Subcutaneous daily Per insulin pump and sliding scale as needed.  Dispense: 3 vial; Refill: 5  - blood glucose (NO BRAND SPECIFIED) test strip; Use to test blood sugar 4 times daily or as directed. To accompany: Blood Glucose Monitor Brands: per insurance.  Dispense: 400 strip; Refill: 3         See Patient Instructions    No follow-ups on file.    SHARMIN Smith CNP  [facility]      Video-Visit Details    Type of service:  Video Visit    Video End Time:9:39 AM    Originating Location (pt. Location): Home    Distant Location (provider location):  [facility]     Platform used for Video Visit: [employer]    No follow-ups on file.       SHARMIN Smith CNP

## 2020-05-05 NOTE — NURSING NOTE
Health Maintenance Due   Topic Date Due     PREVENTIVE CARE VISIT  1988     HIV SCREENING  05/23/2003     EYE EXAM  01/01/2018     DIABETIC FOOT EXAM  02/26/2019     PHQ-2  01/01/2020     A1C  01/29/2020     DTAP/TDAP/TD IMMUNIZATION (7 - Td) 04/13/2020     Miracle Ely LPN........5/5/2020 9:21 AM

## 2020-06-22 ENCOUNTER — TELEPHONE (OUTPATIENT)
Dept: PHARMACY | Facility: OTHER | Age: 30
End: 2020-06-22

## 2020-06-22 NOTE — TELEPHONE ENCOUNTER
I called pt and gave him a friendly reminder to get his A1c lab done. He will call the [facility] to schedule a lab-only appt.    Carmen Reese, PharmD  Medication Therapy Management Pharmacist  Pager: [phone]

## 2020-09-08 ENCOUNTER — TELEPHONE (OUTPATIENT)
Dept: PHARMACY | Facility: OTHER | Age: 30
End: 2020-09-08

## 2020-09-08 NOTE — LETTER
[facility]  [address] [facility] [address] [phone]        September 16, 2020    Kashif Rodriguez  [address] [place] [facility] [address]          Dear Kashif,    Our office received a communication from Carmen [facility] Pharmacist, that you are due for a recheck on your diabetes.  We have attempted to call you to schedule an appointment with Coretta Obando, but we have been unsuccessful in reaching you.  Please call the clinic to schedule with your Primary Provider for a diabetic check in the near future.  This can be done via VIDEO (preferred) telephone or Face 2 Face.    Sincerely,        Coretta Obando, CNP

## 2020-09-08 NOTE — TELEPHONE ENCOUNTER
Pt is due for PCP visit to recheck diabetes. Routing to scheduling pool to call pt and offer appt.    Carmen Reese, PharmD  Medication Therapy Management Pharmacist  Pager: [phone]

## 2020-09-09 NOTE — TELEPHONE ENCOUNTER
2nd attempt to reach out to patient. Unable to leave a message. Will route back to team so a letter can be sent.  Thank you,  Airam Sepulveda   for [facility]

## 2020-11-16 ENCOUNTER — TELEPHONE (OUTPATIENT)
Dept: PHARMACY | Facility: OTHER | Age: 30
End: 2020-11-16

## 2020-11-16 NOTE — TELEPHONE ENCOUNTER
We have been unable to reach this patient for MTM follow-up after several attempts. We will stop reaching out to the patient at this time. Please let us know if we can assist in this patient's care in the future.    Carmen Reese, PharmD  Medication Therapy Management Pharmacist  Pager: [phone]

## 2021-02-11 DIAGNOSIS — E10.65 TYPE 1 DIABETES MELLITUS WITH HYPERGLYCEMIA (H): ICD-10-CM

## 2021-02-11 NOTE — PROGRESS NOTES
"  Renny Rowland is a 30 year old who presents for the following health issues, accompanied by his 1 y/o and 6 y/o children.     History of Present Illness       Diabetes:   He presents for follow up of diabetes.  He is checking home blood glucose a few times a month. He checks blood glucose after meals.  Blood glucose is sometimes over 200 and never under 70. He is aware of hypoglycemia symptoms including shakiness and other. He is concerned about other.  He is not experiencing numbness or burning in feet, excessive thirst, blurry vision, weight changes or redness, sores or blisters on feet. The patient has not had a diabetic eye exam in the last 12 months.         He eats 0-1 servings of fruits and vegetables daily.He consumes 0 sweetened beverage(s) daily.He exercises with enough effort to increase his heart rate 20 to 29 minutes per day.  He exercises with enough effort to increase his heart rate 3 or less days per week. He is missing 2 dose(s) of medications per week.  He is not taking prescribed medications regularly due to remembering to take.     The pt reports feeling \"not great\" in the past ~1 week due to running out of his insulin. He states that he ran out of refills so he could not get his medicine. Pt received his medication on Wednesday, and he has been feeling better since. During the week without insulin, the pt reports he had felt thirsty, irritable, sweaty, and experienced a heart-racing sensation. His highest blood glucose reading was 560 a few days ago. Prior to running out of his insulin last week, the pt reports his blood sugars average between . He denies any glucose levels below 70. He does not have any concerns or new symptoms to address today. The pt uses his insulin pump and also a sliding scale for meals. Pt denies numbness, tingling, skin changes or lesions, changes in vision, or significant changes in his weight. Pt mentions that he has been monitoring his FitBit watch to " "ensure adequate water intake and to attempt increasing his activity level.    BP Readings from Last 2 Encounters:   02/18/21 104/68   12/05/19 100/70     Hemoglobin A1C (%)   Date Value   02/18/2021 11.0 (H)   10/29/2019 9.1 (H)     LDL Cholesterol Calculated (mg/dL)   Date Value   02/18/2021 95   10/29/2019 89     LDL Cholesterol Direct (mg/dL)   Date Value   10/29/2019 82       Review of Systems   Constitutional, HEENT, cardiovascular, pulmonary, GI, , musculoskeletal, neuro, skin, and endocrine systems are negative, except as otherwise noted.      Objective    /68   Pulse 120   Temp 97.7  F (36.5  C) (Temporal)   Resp 18   Ht 1.81 m (5' 11.25\")   Wt 99.8 kg (220 lb)   SpO2 97%   BMI 30.47 kg/m    Body mass index is 30.47 kg/m .  Physical Exam   GENERAL: healthy, alert and no distress  EYES: Eyes grossly normal to inspection, conjunctivae and sclerae normal  HENT: ear canals and TM's normal, mouth without ulcers or lesions  NECK: no adenopathy, no asymmetry, masses, or scars and thyroid normal to palpation  RESP: lungs clear to auscultation - no rales, rhonchi or wheezes  CV: regular rhythm and rate, although slightly forceful; normal S1 S2, no S3 or S4, no murmur, click or rub, no peripheral edema, peripheral pulses palpable bilaterally.  ABDOMEN: soft, nontender, no hepatosplenomegaly, no masses and bowel sounds normal  MS: no gross musculoskeletal defects noted, no edema  SKIN: no suspicious lesions or rashes  NEURO: Normal strength and tone, mentation intact and speech normal  PSYCH: mentation appears normal, affect normal/bright  LYMPH: no cervical or supraclavicular adenopathy.  Diabetic foot exam: Dorsalis pedis and posterior tibialis pulses palpable bilaterally. No trophic changes or ulcerative lesions. Normal monofilament exam with full sensation intact bilaterally.     Results for orders placed or performed in visit on 02/18/21 (from the past 24 hour(s))   Lipid panel reflex to direct LDL " Fasting   Result Value Ref Range    Cholesterol 163 <200 mg/dL    Triglycerides 87 <150 mg/dL    HDL Cholesterol 51 >39 mg/dL    LDL Cholesterol Calculated 95 <100 mg/dL    Non HDL Cholesterol 112 <130 mg/dL   Basic metabolic panel  (Ca, Cl, CO2, Creat, Gluc, K, Na, BUN)   Result Value Ref Range    Sodium 139 133 - 144 mmol/L    Potassium 4.1 3.4 - 5.3 mmol/L    Chloride 108 94 - 109 mmol/L    Carbon Dioxide 27 20 - 32 mmol/L    Anion Gap 4 3 - 14 mmol/L    Glucose 238 (H) 70 - 99 mg/dL    Urea Nitrogen 12 7 - 30 mg/dL    Creatinine 0.82 0.66 - 1.25 mg/dL    GFR Estimate >90 >60 mL/min/[1.73_m2]    GFR Estimate If Black >90 >60 mL/min/[1.73_m2]    Calcium 8.7 8.5 - 10.1 mg/dL   Hemoglobin A1c   Result Value Ref Range    Hemoglobin A1C 11.0 (H) 0 - 5.6 %   Albumin Random Urine Quantitative with Creat Ratio   Result Value Ref Range    Creatinine Urine 84 mg/dL    Albumin Urine mg/L 5 mg/L    Albumin Urine mg/g Cr 6.31 0 - 17 mg/g Cr       Assessment & Plan     Type 1 diabetes mellitus with hyperglycemia (H)   Pt presents for medication refills and a diabetes check. He ran out of his insulin recently and went ~1 week without medication. During this period, he reports his highest blood glucose level at 560. He had symptoms including tachycardia, irritability, and thirst. He has been feeling well aside from that week without insulin. The pt has an insulin pump and also reports using a sliding scale at mealtime. The pt needs updated labs to assess his lipids, Hgb A1c, urine albumin, and BMP for kidney function and electrolytes. A diabetic foot exam was completed at the visit, without abnormalities. Will f/u based on lab results and adjust medications if needed.  - Albumin Random Urine Quantitative with Creat Ratio  - Lipid panel reflex to direct LDL Fasting  - Basic metabolic panel  (Ca, Cl, CO2, Creat, Gluc, K, Na, BUN)  - Hemoglobin A1c  - FOOT EXAM  - blood glucose (NO BRAND SPECIFIED) test strip; Use to test blood  "sugar 4 times daily or as directed. To accompany: Blood Glucose Monitor Brands: per insurance.  - insulin aspart (NOVOLOG VIAL) 100 UNITS/ML vial; Inject 80 Units Subcutaneous daily Per insulin pump and sliding scale as needed.    Need for tetanus booster  Tetanus booster updated today.  - TDAP VACCINE (Adacel, Boostrix)  [[id number]]    Hyperlipidemia LDL goal <100  See above.  - Lipid panel reflex to direct LDL Fasting    BMI:   Estimated body mass index is 30.47 kg/m  as calculated from the following:    Height as of this encounter: 1.81 m (5' 11.25\").    Weight as of this encounter: 99.8 kg (220 lb).   Weight management plan: Discussed healthy diet and exercise guidelines    No follow-ups on file.    ISRAEL Vázquez-S2  2/18/21    I was present with the medical student for this service. I personally verified the history of present illness and performed the physical examination and medical decision making. I have verified all of the student's documentation for this encounter.     MALLORIE GarciaC  [facility]        "

## 2021-02-12 DIAGNOSIS — E10.65 TYPE 1 DIABETES MELLITUS WITH HYPERGLYCEMIA (H): ICD-10-CM

## 2021-02-12 NOTE — TELEPHONE ENCOUNTER
"Denied  Sent today to this pharmacy 2/12/2021      Requested Prescriptions   Pending Prescriptions Disp Refills     insulin aspart (NOVOLOG VIAL) 100 UNITS/ML vial       Sig: Inject 80 Units Subcutaneous daily Per insulin pump and sliding scale as needed.             Short Acting Insulin Protocol Failed - 2/11/2021  3:17 PM        Failed - Serum creatinine on file in past 12 months     Recent Labs   Lab Test 10/29/19  1336   CR 0.93     Ok to refill medication if creatinine is low          Failed - HgbA1C in past 3 or 6 months     If HgbA1C is 8 or greater, it needs to be on file within the past 3 months.  If less than 8, must be on file within the past 6 months.     Recent Labs   Lab Test 10/29/19  1336   A1C 9.1*           Failed - Recent (6 mo) or future (30 days) visit within the authorizing provider's specialty     Patient had office visit in the last 6 months or has a visit in the next 30 days with authorizing provider or within the authorizing provider's specialty.  See \"Patient Info\" tab in inbasket, or \"Choose Columns\" in Meds & Orders section of the refill encounter.            Passed - Medication is active on med list        Passed - Patient is age 18 or older         Noemi Mcmillan RN      "

## 2021-02-12 NOTE — TELEPHONE ENCOUNTER
Routing to Alma Larose PA-C. Have insulin pended and pharmacy. Patient is out and has appt with you Feb 18.     Margot Vyas MA 2/12/2021  2:30 PM

## 2021-02-12 NOTE — TELEPHONE ENCOUNTER
Pt has been out of insulin for a week Please refill today. He is not feeling well .  He uses [facility] in [place]. He has appt set up for Feb 18 with Alma Larose, Please call him back today.

## 2021-02-18 ENCOUNTER — OFFICE VISIT (OUTPATIENT)
Dept: FAMILY MEDICINE | Facility: CLINIC | Age: 31
End: 2021-02-18
Payer: COMMERCIAL

## 2021-02-18 VITALS
HEART RATE: 120 BPM | RESPIRATION RATE: 18 BRPM | TEMPERATURE: 97.7 F | HEIGHT: 71 IN | SYSTOLIC BLOOD PRESSURE: 104 MMHG | OXYGEN SATURATION: 97 % | WEIGHT: 220 LBS | DIASTOLIC BLOOD PRESSURE: 68 MMHG | BODY MASS INDEX: 30.8 KG/M2

## 2021-02-18 DIAGNOSIS — E10.65 TYPE 1 DIABETES MELLITUS WITH HYPERGLYCEMIA (H): Primary | ICD-10-CM

## 2021-02-18 DIAGNOSIS — Z23 NEED FOR TETANUS BOOSTER: ICD-10-CM

## 2021-02-18 DIAGNOSIS — E78.5 HYPERLIPIDEMIA LDL GOAL <100: ICD-10-CM

## 2021-02-18 LAB
ANION GAP SERPL CALCULATED.3IONS-SCNC: 4 MMOL/L (ref 3–14)
BUN SERPL-MCNC: 12 MG/DL (ref 7–30)
CALCIUM SERPL-MCNC: 8.7 MG/DL (ref 8.5–10.1)
CHLORIDE SERPL-SCNC: 108 MMOL/L (ref 94–109)
CHOLEST SERPL-MCNC: 163 MG/DL
CO2 SERPL-SCNC: 27 MMOL/L (ref 20–32)
CREAT SERPL-MCNC: 0.82 MG/DL (ref 0.66–1.25)
CREAT UR-MCNC: 84 MG/DL
GFR SERPL CREATININE-BSD FRML MDRD: >90 ML/MIN/{1.73_M2}
GLUCOSE SERPL-MCNC: 238 MG/DL (ref 70–99)
HBA1C MFR BLD: 11 % (ref 0–5.6)
HDLC SERPL-MCNC: 51 MG/DL
LDLC SERPL CALC-MCNC: 95 MG/DL
MICROALBUMIN UR-MCNC: 5 MG/L
MICROALBUMIN/CREAT UR: 6.31 MG/G CR (ref 0–17)
NONHDLC SERPL-MCNC: 112 MG/DL
POTASSIUM SERPL-SCNC: 4.1 MMOL/L (ref 3.4–5.3)
SODIUM SERPL-SCNC: 139 MMOL/L (ref 133–144)
TRIGL SERPL-MCNC: 87 MG/DL

## 2021-02-18 PROCEDURE — 83036 HEMOGLOBIN GLYCOSYLATED A1C: CPT | Performed by: PHYSICIAN ASSISTANT

## 2021-02-18 PROCEDURE — 99207 PR FOOT EXAM NO CHARGE: CPT | Performed by: PHYSICIAN ASSISTANT

## 2021-02-18 PROCEDURE — 36415 COLL VENOUS BLD VENIPUNCTURE: CPT | Performed by: PHYSICIAN ASSISTANT

## 2021-02-18 PROCEDURE — 99214 OFFICE O/P EST MOD 30 MIN: CPT | Mod: 25 | Performed by: PHYSICIAN ASSISTANT

## 2021-02-18 PROCEDURE — 80048 BASIC METABOLIC PNL TOTAL CA: CPT | Performed by: PHYSICIAN ASSISTANT

## 2021-02-18 PROCEDURE — 82043 UR ALBUMIN QUANTITATIVE: CPT | Performed by: PHYSICIAN ASSISTANT

## 2021-02-18 PROCEDURE — 90715 TDAP VACCINE 7 YRS/> IM: CPT | Performed by: PHYSICIAN ASSISTANT

## 2021-02-18 PROCEDURE — 90471 IMMUNIZATION ADMIN: CPT | Performed by: PHYSICIAN ASSISTANT

## 2021-02-18 PROCEDURE — 80061 LIPID PANEL: CPT | Performed by: PHYSICIAN ASSISTANT

## 2021-02-18 ASSESSMENT — PATIENT HEALTH QUESTIONNAIRE - PHQ9
SUM OF ALL RESPONSES TO PHQ QUESTIONS 1-9: 0
10. IF YOU CHECKED OFF ANY PROBLEMS, HOW DIFFICULT HAVE THESE PROBLEMS MADE IT FOR YOU TO DO YOUR WORK, TAKE CARE OF THINGS AT HOME, OR GET ALONG WITH OTHER PEOPLE: NOT DIFFICULT AT ALL
SUM OF ALL RESPONSES TO PHQ QUESTIONS 1-9: 0

## 2021-02-18 ASSESSMENT — ANXIETY QUESTIONNAIRES
5. BEING SO RESTLESS THAT IT IS HARD TO SIT STILL: NOT AT ALL
2. NOT BEING ABLE TO STOP OR CONTROL WORRYING: NOT AT ALL
6. BECOMING EASILY ANNOYED OR IRRITABLE: NOT AT ALL
7. FEELING AFRAID AS IF SOMETHING AWFUL MIGHT HAPPEN: NOT AT ALL
1. FEELING NERVOUS, ANXIOUS, OR ON EDGE: NOT AT ALL
7. FEELING AFRAID AS IF SOMETHING AWFUL MIGHT HAPPEN: NOT AT ALL
4. TROUBLE RELAXING: NOT AT ALL
GAD7 TOTAL SCORE: 0
3. WORRYING TOO MUCH ABOUT DIFFERENT THINGS: NOT AT ALL

## 2021-02-18 ASSESSMENT — MIFFLIN-ST. JEOR: SCORE: 1974

## 2021-02-18 NOTE — RESULT ENCOUNTER NOTE
Please notify patient that his A1c is at 11 meaning diabetes has been poorly controlled. I would really encourage better monitoring of sugars and adjusting sliding scale as needed to accommodate. Would also recommend he see MTM or diabetes education again.     Alma Larose PA-C

## 2021-02-18 NOTE — PROGRESS NOTES
Prior to immunization administration, verified patients identity using patient s name and date of birth. Please see Immunization Activity for additional information.     Screening Questionnaire for Adult Immunization    Are you sick today?   No   Do you have allergies to medications, food, a vaccine component or latex?   No   Have you ever had a serious reaction after receiving a vaccination?   No   Do you have a long-term health problem with heart, lung, kidney, or metabolic disease (e.g., diabetes), asthma, a blood disorder, no spleen, complement component deficiency, a cochlear implant, or a spinal fluid leak?  Are you on long-term aspirin therapy?   No   Do you have cancer, leukemia, HIV/AIDS, or any other immune system problem?   No   Do you have a parent, brother, or sister with an immune system problem?   No   In the past 3 months, have you taken medications that affect  your immune system, such as prednisone, other steroids, or anticancer drugs; drugs for the treatment of rheumatoid arthritis, Crohn s disease, or psoriasis; or have you had radiation treatments?   No   Have you had a seizure, or a brain or other nervous system problem?   No   During the past year, have you received a transfusion of blood or blood    products, or been given immune (gamma) globulin or antiviral drug?   No   For women: Are you pregnant or is there a chance you could become       pregnant during the next month?   No   Have you received any vaccinations in the past 4 weeks?   No     Immunization questionnaire answers were all negative.        Per orders of Alma Larose , injection of Tdap given by Madan Mloina CMA. Patient instructed to remain in clinic for 15 minutes afterwards, and to report any adverse reaction to me immediately.       Screening performed by Madan Molina CMA on 2/18/2021 at 11:40 AM.

## 2021-02-18 NOTE — LETTER
February 18, 2021      Kashif J Manuelleftypino  [address]  [place] [address]        Dear ,    We are writing to inform you of your test results.    Your A1c is at 11 meaning diabetes has been poorly controlled. I would really encourage better monitoring of sugars and adjusting sliding scale as needed to accommodate. Would also recommend you see MTM or diabetes education again.     Resulted Orders   Albumin Random Urine Quantitative with Creat Ratio   Result Value Ref Range    Creatinine Urine 84 mg/dL    Albumin Urine mg/L 5 mg/L    Albumin Urine mg/g Cr 6.31 0 - 17 mg/g Cr   Lipid panel reflex to direct LDL Fasting   Result Value Ref Range    Cholesterol 163 <200 mg/dL    Triglycerides 87 <150 mg/dL      Comment:      Non Fasting    HDL Cholesterol 51 >39 mg/dL    LDL Cholesterol Calculated 95 <100 mg/dL      Comment:      Desirable:       <100 mg/dl    Non HDL Cholesterol 112 <130 mg/dL   Basic metabolic panel  (Ca, Cl, CO2, Creat, Gluc, K, Na, BUN)   Result Value Ref Range    Sodium 139 133 - 144 mmol/L    Potassium 4.1 3.4 - 5.3 mmol/L    Chloride 108 94 - 109 mmol/L    Carbon Dioxide 27 20 - 32 mmol/L    Anion Gap 4 3 - 14 mmol/L    Glucose 238 (H) 70 - 99 mg/dL      Comment:      Non Fasting    Urea Nitrogen 12 7 - 30 mg/dL    Creatinine 0.82 0.66 - 1.25 mg/dL    GFR Estimate >90 >60 mL/min/[1.73_m2]      Comment:      Non  GFR Calc  Starting 12/18/2018, serum creatinine based estimated GFR (eGFR) will be   calculated using the Chronic Kidney Disease Epidemiology Collaboration   (CKD-EPI) equation.      GFR Estimate If Black >90 >60 mL/min/[1.73_m2]      Comment:       GFR Calc  Starting 12/18/2018, serum creatinine based estimated GFR (eGFR) will be   calculated using the Chronic Kidney Disease Epidemiology Collaboration   (CKD-EPI) equation.      Calcium 8.7 8.5 - 10.1 mg/dL   Hemoglobin A1c   Result Value Ref Range    Hemoglobin A1C 11.0 (H) 0 - 5.6 %       Comment:      Normal <5.7% Prediabetes 5.7-6.4%  Diabetes 6.5% or higher - adopted from ADA   consensus guidelines.         If you have any questions or concerns, please call the clinic at the number listed above.       Sincerely,      Alma Larose PA-C

## 2021-02-19 ASSESSMENT — ANXIETY QUESTIONNAIRES: GAD7 TOTAL SCORE: 0

## 2021-11-09 DIAGNOSIS — E10.65 TYPE 1 DIABETES MELLITUS WITH HYPERGLYCEMIA (H): ICD-10-CM

## 2021-11-11 NOTE — TELEPHONE ENCOUNTER
Pending Prescriptions:                       Disp   Refills    insulin aspart (NOVOLOG VIAL) 100 UNITS/ML*30 mL  5        Sig: Inject 80 Units Subcutaneous daily Per insulin pump           and sliding scale as needed.    Routing refill request to provider for review/approval because:  Labs out of range:    Lab Results   Component Value Date    A1C 11.0 02/18/2021    A1C 9.1 10/29/2019    A1C 8.5 08/23/2019    A1C 8.1 02/06/2019    A1C 8.4 11/12/2018

## 2021-11-14 ENCOUNTER — HEALTH MAINTENANCE LETTER (OUTPATIENT)
Age: 31
End: 2021-11-14

## 2021-11-18 NOTE — TELEPHONE ENCOUNTER
I have sent the patient a [employer] message with the message below. Rakel Mantilla, [facility]

## 2021-12-20 DIAGNOSIS — E10.65 TYPE 1 DIABETES MELLITUS WITH HYPERGLYCEMIA (H): ICD-10-CM

## 2021-12-23 NOTE — TELEPHONE ENCOUNTER
Pending Prescriptions:                       Disp   Refills    insulin aspart (NOVOLOG VIAL) 100 UNITS/ML*30 mL  0        Sig: Inject 80 Units Subcutaneous daily Per insulin pump           and sliding scale as needed.    Routing refill request to provider for review/approval because:  Labs out of range:    Lab Results   Component Value Date    A1C 11.0 02/18/2021    A1C 9.1 10/29/2019    A1C 8.5 08/23/2019    A1C 8.1 02/06/2019    A1C 8.4 11/12/2018

## 2022-01-24 ENCOUNTER — OFFICE VISIT (OUTPATIENT)
Dept: FAMILY MEDICINE | Facility: CLINIC | Age: 32
End: 2022-01-24
Payer: COMMERCIAL

## 2022-01-24 VITALS
OXYGEN SATURATION: 97 % | HEART RATE: 115 BPM | TEMPERATURE: 97.6 F | WEIGHT: 222 LBS | DIASTOLIC BLOOD PRESSURE: 74 MMHG | SYSTOLIC BLOOD PRESSURE: 118 MMHG | BODY MASS INDEX: 30.75 KG/M2

## 2022-01-24 DIAGNOSIS — E10.9 TYPE 1 DIABETES MELLITUS WITHOUT COMPLICATION (H): Primary | ICD-10-CM

## 2022-01-24 DIAGNOSIS — E78.5 HYPERLIPIDEMIA LDL GOAL <100: ICD-10-CM

## 2022-01-24 DIAGNOSIS — E10.65 TYPE 1 DIABETES MELLITUS WITH HYPERGLYCEMIA (H): ICD-10-CM

## 2022-01-24 LAB
ANION GAP SERPL CALCULATED.3IONS-SCNC: 3 MMOL/L (ref 3–14)
BUN SERPL-MCNC: 12 MG/DL (ref 7–30)
CALCIUM SERPL-MCNC: 9.2 MG/DL (ref 8.5–10.1)
CHLORIDE BLD-SCNC: 108 MMOL/L (ref 94–109)
CHOLEST SERPL-MCNC: 132 MG/DL
CO2 SERPL-SCNC: 29 MMOL/L (ref 20–32)
CREAT SERPL-MCNC: 1.12 MG/DL (ref 0.66–1.25)
CREAT UR-MCNC: 155 MG/DL
FASTING STATUS PATIENT QL REPORTED: NO
GFR SERPL CREATININE-BSD FRML MDRD: 89 ML/MIN/1.73M2
GLUCOSE BLD-MCNC: 172 MG/DL (ref 70–99)
HBA1C MFR BLD: 10.7 % (ref 0–5.6)
HDLC SERPL-MCNC: 43 MG/DL
LDLC SERPL CALC-MCNC: 73 MG/DL
MICROALBUMIN UR-MCNC: 101 MG/L
MICROALBUMIN/CREAT UR: 65.16 MG/G CR (ref 0–17)
NONHDLC SERPL-MCNC: 89 MG/DL
POTASSIUM BLD-SCNC: 3.6 MMOL/L (ref 3.4–5.3)
SODIUM SERPL-SCNC: 140 MMOL/L (ref 133–144)
TRIGL SERPL-MCNC: 79 MG/DL

## 2022-01-24 PROCEDURE — 80061 LIPID PANEL: CPT | Performed by: PHYSICIAN ASSISTANT

## 2022-01-24 PROCEDURE — 80048 BASIC METABOLIC PNL TOTAL CA: CPT | Performed by: PHYSICIAN ASSISTANT

## 2022-01-24 PROCEDURE — 83036 HEMOGLOBIN GLYCOSYLATED A1C: CPT | Performed by: PHYSICIAN ASSISTANT

## 2022-01-24 PROCEDURE — 36415 COLL VENOUS BLD VENIPUNCTURE: CPT | Performed by: PHYSICIAN ASSISTANT

## 2022-01-24 PROCEDURE — 82043 UR ALBUMIN QUANTITATIVE: CPT | Performed by: PHYSICIAN ASSISTANT

## 2022-01-24 PROCEDURE — 99207 PR FOOT EXAM NO CHARGE: CPT | Performed by: PHYSICIAN ASSISTANT

## 2022-01-24 PROCEDURE — 99214 OFFICE O/P EST MOD 30 MIN: CPT | Performed by: PHYSICIAN ASSISTANT

## 2022-01-24 RX ORDER — PROCHLORPERAZINE 25 MG/1
1 SUPPOSITORY RECTAL
Qty: 1 EACH | Refills: 1 | Status: SHIPPED | OUTPATIENT
Start: 2022-01-24 | End: 2023-10-25

## 2022-01-24 RX ORDER — PROCHLORPERAZINE 25 MG/1
1 SUPPOSITORY RECTAL
Qty: 3 EACH | Refills: 5 | Status: SHIPPED | OUTPATIENT
Start: 2022-01-24 | End: 2023-10-25

## 2022-01-24 RX ORDER — PROCHLORPERAZINE 25 MG/1
1 SUPPOSITORY RECTAL ONCE
Qty: 1 EACH | Refills: 0 | Status: SHIPPED | OUTPATIENT
Start: 2022-01-24 | End: 2022-01-24

## 2022-01-24 NOTE — PROGRESS NOTES
Renny Geller is a 31 year old who presents for the following health issues     HPI     Diabetes Follow-up      How often are you checking your blood sugar? Not at all, lost sensor    What concerns do you have today about your diabetes? None     Do you have any of these symptoms? (Select all that apply)  No numbness or tingling in feet.  No redness, sores or blisters on feet.  No complaints of excessive thirst.  No reports of blurry vision.  No significant changes to weight.    Have you had a diabetic eye exam in the last 12 months? No    Patient presents today for diabetes follow-up.Historically his diabetes has not been well controlled. His last A1c was 11.0. He has not been routinely checking his blood sugars. Had a continuous glucose sensor but lost this. Does feel this was helpful when he had this. He has an insulin pump. Unsure how much his basal rate is set at. He also reports using meal time insulin with each carb choice but is not overly consistent with this as well. He does feel his blood sugars have been running high. Maybe has symptoms of a low blood sugar 1 time per week. He has not seen diabetes education in several years. Due to see his eye doctor soon.     BP Readings from Last 2 Encounters:   01/24/22 118/74   02/18/21 104/68     Hemoglobin A1C POCT (%)   Date Value   02/18/2021 11.0 (H)   10/29/2019 9.1 (H)     LDL Cholesterol Calculated (mg/dL)   Date Value   02/18/2021 95   10/29/2019 89     LDL Cholesterol Direct (mg/dL)   Date Value   10/29/2019 82               Review of Systems   Constitutional, HEENT, cardiovascular, pulmonary, GI, , musculoskeletal, neuro, skin, endocrine and psych systems are negative, except as otherwise noted.      Objective    /74   Pulse 115   Temp 97.6  F (36.4  C) (Temporal)   Wt 100.7 kg (222 lb)   SpO2 97%   BMI 30.75 kg/m    Body mass index is 30.75 kg/m .  Physical Exam   GENERAL: healthy, alert and no distress  EYES: Eyes grossly normal to  inspection, PERRL and conjunctivae and sclerae normal  HENT: ear canals and TM's normal, nose and mouth without ulcers or lesions  NECK: no adenopathy, no asymmetry, masses, or scars and thyroid normal to palpation  RESP: lungs clear to auscultation - no rales, rhonchi or wheezes  CV: regular rate and rhythm, normal S1 S2, no S3 or S4, no murmur, click or rub, no peripheral edema and peripheral pulses strong  ABDOMEN: soft, nontender, no hepatosplenomegaly, no masses and bowel sounds normal  SKIN: no suspicious lesions or rashes  PSYCH: mentation appears normal, affect normal/bright  Diabetic foot exam: normal DP and PT pulses, no trophic changes or ulcerative lesions and normal sensory exam    Assessment & Plan     Type 1 diabetes mellitus without complication (H)  Due for follow-up of labs today. He has not been routinely checking his blood sugars. Will send a new CGM to pharmacy. I also placed referral to diabetes education as it seems patient does not have a good understanding of his insulin pump or how to adjust this based on his sugars. Encouraged ongoing diet and exercise modifications as able. Patient was asked to follow-up in 3 months.   - Hemoglobin A1c; Future  - Basic metabolic panel  (Ca, Cl, CO2, Creat, Gluc, K, Na, BUN); Future  - Albumin Random Urine Quantitative with Creat Ratio; Future  - Lipid panel reflex to direct LDL Fasting; Future  - Continuous Blood Gluc  (DEXCOM G6 ) ADR; 1 each once for 1 dose Use to read blood sugars as per 's instructions.  - Continuous Blood Gluc Transmit (DEXCOM G6 TRANSMITTER) MISC; 1 each every 3 months Change every 3 months.  - Continuous Blood Gluc Sensor (DEXCOM G6 SENSOR) MISC; 1 each every 10 days Change every 10 days.  - Hemoglobin A1c  - Basic metabolic panel  (Ca, Cl, CO2, Creat, Gluc, K, Na, BUN)  - Albumin Random Urine Quantitative with Creat Ratio  - Lipid panel reflex to direct LDL Fasting  - AMB Adult Diabetes Educator  Referral; Future    Hyperlipidemia LDL goal <100    Type 1 diabetes mellitus with hyperglycemia (H)  - insulin aspart (NOVOLOG VIAL) 100 UNITS/ML vial; Inject 80 Units Subcutaneous daily Per insulin pump and sliding scale as needed.  - AMB Adult Diabetes Educator Referral; Future    The patient indicates understanding of these issues and agrees with the plan.    SHARI Garcia [facility]

## 2022-01-29 ENCOUNTER — TELEPHONE (OUTPATIENT)
Dept: FAMILY MEDICINE | Facility: CLINIC | Age: 32
End: 2022-01-29
Payer: COMMERCIAL

## 2022-01-29 NOTE — TELEPHONE ENCOUNTER
Diabetes Education Scheduling Outreach #1:    Call to patient to schedule. Patient declined to schedule.    Clover Herrera  [facility]  Diabetes and Nutrition Scheduling

## 2022-05-01 ENCOUNTER — HEALTH MAINTENANCE LETTER (OUTPATIENT)
Age: 32
End: 2022-05-01

## 2022-07-22 DIAGNOSIS — E10.65 TYPE 1 DIABETES MELLITUS WITH HYPERGLYCEMIA (H): ICD-10-CM

## 2022-07-25 RX ORDER — INSULIN ASPART 100 [IU]/ML
INJECTION, SOLUTION INTRAVENOUS; SUBCUTANEOUS
Qty: 30 ML | Refills: 0 | Status: SHIPPED | OUTPATIENT
Start: 2022-07-25 | End: 2022-09-09

## 2022-07-26 NOTE — TELEPHONE ENCOUNTER
Patient informed via mychart to schedule, 7/29 reminder if not read to send letter.   Olya Mercedes MA

## 2022-08-21 ENCOUNTER — HEALTH MAINTENANCE LETTER (OUTPATIENT)
Age: 32
End: 2022-08-21

## 2022-09-07 DIAGNOSIS — E10.65 TYPE 1 DIABETES MELLITUS WITH HYPERGLYCEMIA (H): ICD-10-CM

## 2022-09-09 RX ORDER — INSULIN ASPART 100 [IU]/ML
INJECTION, SOLUTION INTRAVENOUS; SUBCUTANEOUS
Qty: 10 ML | Refills: 0 | Status: SHIPPED | OUTPATIENT
Start: 2022-09-09 | End: 2022-09-23

## 2022-09-09 NOTE — TELEPHONE ENCOUNTER
Pending Prescriptions:                       Disp   Refills    insulin aspart (NOVOLOG VIAL) 100 UNITS/ML*30 mL  0        Sig: INJECT 80 UNITS SUBCUTANEOUS DAILY PER INSULIN PUMP           AND SLIDING SCALE AS NEEDED.      Routing refill request to provider for review/approval because:  Tamara given x1 and patient did not follow up, please advise  Labs out of range:    Lab Results   Component Value Date    A1C 10.7 01/24/2022    A1C 11.0 02/18/2021    A1C 9.1 10/29/2019    A1C 8.5 08/23/2019    A1C 8.1 02/06/2019    A1C 8.4 11/12/2018       Patient needs to be seen because:  DM check    Geovanna Hernandez RN

## 2022-09-09 NOTE — TELEPHONE ENCOUNTER
Patient was notified of message and scheduled an appointment for 10/20/2022 for a medication follow up.  Tamar Ashton, MA

## 2022-09-19 DIAGNOSIS — E10.65 TYPE 1 DIABETES MELLITUS WITH HYPERGLYCEMIA (H): ICD-10-CM

## 2022-09-22 ENCOUNTER — MYC REFILL (OUTPATIENT)
Dept: FAMILY MEDICINE | Facility: CLINIC | Age: 32
End: 2022-09-22

## 2022-09-22 DIAGNOSIS — E10.65 TYPE 1 DIABETES MELLITUS WITH HYPERGLYCEMIA (H): ICD-10-CM

## 2022-09-22 NOTE — TELEPHONE ENCOUNTER
"Requested Prescriptions   Pending Prescriptions Disp Refills    insulin aspart (NOVOLOG VIAL) 100 UNITS/ML vial [Pharmacy Med Name: INSULIN ASPART 100UNIT/ML INJ] 10 mL 0     Sig: INJECT 80 UNITS SUBCUTANEOUS DAILY PER INSULIN PUMP AND SLIDING SCALE AS NEEDED.        Short Acting Insulin Protocol Failed - 9/19/2022  4:48 PM        Failed - HgbA1C in past 3 or 6 months     If HgbA1C is 8 or greater, it needs to be on file within the past 3 months.  If less than 8, must be on file within the past 6 months.     Recent Labs   Lab Test 01/24/22  1316   A1C 10.7*             Failed - Recent (6 mo) or future (30 days) visit within the authorizing provider's specialty     Patient had office visit in the last 6 months or has a visit in the next 30 days with authorizing provider or within the authorizing provider's specialty.  See \"Patient Info\" tab in inbasket, or \"Choose Columns\" in Meds & Orders section of the refill encounter.            Passed - Serum creatinine on file in past 12 months     Recent Labs   Lab Test 01/24/22  1316   CR 1.12       Ok to refill medication if creatinine is low          Passed - Medication is active on med list        Passed - Patient is age 18 or older                YVONNE MajanoN, RN          "

## 2022-09-23 RX ORDER — INSULIN ASPART 100 [IU]/ML
INJECTION, SOLUTION INTRAVENOUS; SUBCUTANEOUS
Qty: 10 ML | Refills: 0 | Status: SHIPPED | OUTPATIENT
Start: 2022-09-23 | End: 2022-10-20

## 2022-09-23 RX ORDER — INSULIN ASPART 100 [IU]/ML
INJECTION, SOLUTION INTRAVENOUS; SUBCUTANEOUS
Qty: 30 ML | Refills: 0 | Status: SHIPPED | OUTPATIENT
Start: 2022-09-23 | End: 2022-10-20

## 2022-10-20 ENCOUNTER — OFFICE VISIT (OUTPATIENT)
Dept: FAMILY MEDICINE | Facility: CLINIC | Age: 32
End: 2022-10-20
Payer: COMMERCIAL

## 2022-10-20 VITALS
WEIGHT: 225.13 LBS | BODY MASS INDEX: 31.52 KG/M2 | SYSTOLIC BLOOD PRESSURE: 126 MMHG | RESPIRATION RATE: 22 BRPM | HEART RATE: 90 BPM | HEIGHT: 71 IN | TEMPERATURE: 97.4 F | DIASTOLIC BLOOD PRESSURE: 80 MMHG | OXYGEN SATURATION: 97 %

## 2022-10-20 DIAGNOSIS — E10.65 TYPE 1 DIABETES MELLITUS WITH HYPERGLYCEMIA (H): ICD-10-CM

## 2022-10-20 LAB — HBA1C MFR BLD: 11.4 % (ref 0–5.6)

## 2022-10-20 PROCEDURE — 36415 COLL VENOUS BLD VENIPUNCTURE: CPT | Performed by: PHYSICIAN ASSISTANT

## 2022-10-20 PROCEDURE — 99214 OFFICE O/P EST MOD 30 MIN: CPT | Performed by: PHYSICIAN ASSISTANT

## 2022-10-20 PROCEDURE — 83036 HEMOGLOBIN GLYCOSYLATED A1C: CPT | Performed by: PHYSICIAN ASSISTANT

## 2022-10-20 RX ORDER — INSULIN ASPART 100 [IU]/ML
INJECTION, SOLUTION INTRAVENOUS; SUBCUTANEOUS
Qty: 30 ML | Refills: 4 | Status: SHIPPED | OUTPATIENT
Start: 2022-10-20 | End: 2023-05-01

## 2022-10-20 ASSESSMENT — PAIN SCALES - GENERAL: PAINLEVEL: NO PAIN (0)

## 2022-10-20 NOTE — PROGRESS NOTES
"Renny Geller is a 32 year old, presenting for the following health issues:  Recheck Medication      History of Present Illness       Diabetes:   He presents for follow up of diabetes.  He is checking home blood glucose one time daily. He checks blood glucose before meals.  Blood glucose is sometimes over 200 and never under 70. He is aware of hypoglycemia symptoms including lethargy. He is concerned about other.  He is not experiencing numbness or burning in feet, excessive thirst, blurry vision, weight changes or redness, sores or blisters on feet. The patient has not had a diabetic eye exam in the last 12 months.         He eats 0-1 servings of fruits and vegetables daily.He consumes 2 sweetened beverage(s) daily.He exercises with enough effort to increase his heart rate 20 to 29 minutes per day.  He exercises with enough effort to increase his heart rate 4 days per week. He is missing 1 dose(s) of medications per week.     Patient presents today for diabetes follow-up. He is on an insulin pump. He reports blood sugars have been a bit all over the place. He reports at times these are as high as 500+ but most often due to a malfunction with his pump. He reports difficulty paying for supplies so often orders these off the Internet rather than going trough insurance. He reports insurance will also not cover a CGM. He has bene trying to eat well. Denies any concerning symptoms. Has not seen an eye doctor in the last year.     Review of Systems   Constitutional, HEENT, cardiovascular, pulmonary, GI, , musculoskeletal, neuro, skin, endocrine and psych systems are negative, except as otherwise noted.      Objective    /80   Pulse 90   Temp 97.4  F (36.3  C) (Temporal)   Resp 22   Ht 1.793 m (5' 10.6\")   Wt 102.1 kg (225 lb 2 oz)   SpO2 97%   BMI 31.76 kg/m    Body mass index is 31.76 kg/m .  Physical Exam   GENERAL: healthy, alert and no distress  HENT: ear canals and TM's normal, nose and mouth " without ulcers or lesions  NECK: no adenopathy, no asymmetry, masses, or scars and thyroid normal to palpation  RESP: lungs clear to auscultation - no rales, rhonchi or wheezes  CV: regular rate and rhythm, normal S1 S2, no S3 or S4, no murmur, click or rub, no peripheral edema and peripheral pulses strong  ABDOMEN: soft, nontender, no hepatosplenomegaly, no masses and bowel sounds normal  MS: no gross musculoskeletal defects noted, no edema  SKIN: no suspicious lesions or rashes  PSYCH: mentation appears normal, affect normal/bright      Assessment & Plan     Type 1 diabetes mellitus with hyperglycemia (H)  Patient with type 1 diabetes managed with an insulin pump. Unfortunately cost has been a big barrier to this care. I recommended seeing diabetes education who can help with assistance programs and insurance coverage. Due for A1c recheck today. Recommended eye exam. Ongoing diet and exercise modifications.  - HEMOGLOBIN A1C; Future  - AMB Adult Diabetes Educator Referral; Future  - insulin aspart (NOVOLOG VIAL) 100 UNITS/ML vial; Inject 80 units subcutaneously daily per insulin pump and sliding scale as needed  - HEMOGLOBIN A1C    The patient indicates understanding of these issues and agrees with the plan.    Alma Larose PA-C  [facility]

## 2022-10-21 ENCOUNTER — TELEPHONE (OUTPATIENT)
Dept: FAMILY MEDICINE | Facility: CLINIC | Age: 32
End: 2022-10-21

## 2022-10-21 NOTE — TELEPHONE ENCOUNTER
Diabetes Education Scheduling Outreach #1:    Call to patient to schedule. Voicemail full.    Also sent [employer] message for second attempt. Requested patient to call to schedule.    [facility]  Diabetes and Nutrition Scheduling

## 2022-12-25 ENCOUNTER — HEALTH MAINTENANCE LETTER (OUTPATIENT)
Age: 32
End: 2022-12-25

## 2023-01-12 ENCOUNTER — TELEPHONE (OUTPATIENT)
Dept: FAMILY MEDICINE | Facility: CLINIC | Age: 33
End: 2023-01-12

## 2023-01-12 NOTE — TELEPHONE ENCOUNTER
Prior Authorization Retail Medication Request    Medication/Dose: insulin aspart (NOVOLOG VIAL) 100 UNITS/ML vial  ICD code (if different than what is on RX):  Type 1 diabetes mellitus with hyperglycemia (H) [E10.65]   Previously Tried and Failed:  na  Rationale:  na    Insurance Name:  [facility]  Insurance ID:  [id number]      Pharmacy Information (if different than what is on RX)  Name:  [facility]  Phone:  [phone]

## 2023-01-12 NOTE — TELEPHONE ENCOUNTER
Central Prior Authorization Team - Phone: [phone]     Prior Authorization Not Needed per Insurance-Brand Novolog is preferred     Received call from [employer], he stated the brand Novolog vial is preferred and gave NDC #[id number]. I provided this to pharmacy.    Medication: insulin aspart (NOVOLOG VIAL) 100 UNITS/ML vial- PA not needed per insurance- Novolog Brand preferred/covered  Insurance Company:    Expected CoPay:      [facility]armacy Filling the Rx: [id number] - [place], [place] - [address] [place] [address]  Pharmacy Notified: Yes called and spoke to Sophy she received paid claim and will notify patient.  Patient Notified: YesComment:  pharmacy will notify when ready

## 2023-01-12 NOTE — TELEPHONE ENCOUNTER
Central Prior Authorization Team - Phone: [phone]     PA Initiation    Medication: insulin aspart (NOVOLOG VIAL) 100 UNITS/ML vial- PA INITIATED  Insurance Company:    Pharmacy Filling the Rx: [id number] - [place] - [address] [place] [address]  Filling Pharmacy Phone: [phone]  Filling Pharmacy Fax:    Start Date: 1/12/2023

## 2023-04-16 ENCOUNTER — HEALTH MAINTENANCE LETTER (OUTPATIENT)
Age: 33
End: 2023-04-16

## 2023-05-01 ENCOUNTER — MYC MEDICAL ADVICE (OUTPATIENT)
Dept: FAMILY MEDICINE | Facility: CLINIC | Age: 33
End: 2023-05-01
Payer: COMMERCIAL

## 2023-05-01 DIAGNOSIS — E10.65 TYPE 1 DIABETES MELLITUS WITH HYPERGLYCEMIA (H): ICD-10-CM

## 2023-05-01 RX ORDER — INSULIN ASPART 100 [IU]/ML
INJECTION, SOLUTION INTRAVENOUS; SUBCUTANEOUS
Qty: 30 ML | Refills: 1 | Status: SHIPPED | OUTPATIENT
Start: 2023-05-01 | End: 2023-07-19

## 2023-07-17 DIAGNOSIS — E10.65 TYPE 1 DIABETES MELLITUS WITH HYPERGLYCEMIA (H): ICD-10-CM

## 2023-07-18 NOTE — TELEPHONE ENCOUNTER
"Requested Prescriptions   Pending Prescriptions Disp Refills    NOVOLOG FLEXPEN 100 UNIT/ML soln [Pharmacy Med Name: NOVOLOG FLEXPEN SOLN FOR INJ] 30 mL 1     Sig: INJECT 80 UNITS SUBCUTANEOUSLY DAILY PER INSULIN PUMP AND SLIDING SCALE AS NEEDED       Short Acting Insulin Protocol Failed - 7/17/2023 10:52 AM        Failed - Serum creatinine on file in past 12 months     Recent Labs   Lab Test 01/24/22  1316   CR 1.12       Ok to refill medication if creatinine is low          Failed - HgbA1C in past 3 or 6 months     If HgbA1C is 8 or greater, it needs to be on file within the past 3 months.  If less than 8, must be on file within the past 6 months.     Recent Labs   Lab Test 10/20/22  1722   A1C 11.4*             Failed - Recent (6 mo) or future (30 days) visit within the authorizing provider's specialty     Patient had office visit in the last 6 months or has a visit in the next 30 days with authorizing provider or within the authorizing provider's specialty.  See \"Patient Info\" tab in inbasket, or \"Choose Columns\" in Meds & Orders section of the refill encounter.            Passed - Medication is active on med list        Passed - Patient is age 18 or older             "

## 2023-07-19 RX ORDER — INSULIN ASPART 100 [IU]/ML
INJECTION, SOLUTION INTRAVENOUS; SUBCUTANEOUS
Qty: 30 ML | Refills: 1 | Status: SHIPPED | OUTPATIENT
Start: 2023-07-19 | End: 2023-10-10

## 2023-09-17 ENCOUNTER — HEALTH MAINTENANCE LETTER (OUTPATIENT)
Age: 33
End: 2023-09-17

## 2023-10-10 DIAGNOSIS — E10.65 TYPE 1 DIABETES MELLITUS WITH HYPERGLYCEMIA (H): ICD-10-CM

## 2023-10-10 RX ORDER — INSULIN ASPART 100 [IU]/ML
INJECTION, SOLUTION INTRAVENOUS; SUBCUTANEOUS
Qty: 30 ML | Refills: 1 | Status: SHIPPED | OUTPATIENT
Start: 2023-10-10 | End: 2023-10-25

## 2023-10-25 ENCOUNTER — OFFICE VISIT (OUTPATIENT)
Dept: FAMILY MEDICINE | Facility: CLINIC | Age: 33
End: 2023-10-25
Payer: COMMERCIAL

## 2023-10-25 VITALS
SYSTOLIC BLOOD PRESSURE: 122 MMHG | TEMPERATURE: 97.4 F | WEIGHT: 224 LBS | DIASTOLIC BLOOD PRESSURE: 70 MMHG | HEART RATE: 80 BPM | HEIGHT: 71 IN | RESPIRATION RATE: 20 BRPM | OXYGEN SATURATION: 98 % | BODY MASS INDEX: 31.36 KG/M2

## 2023-10-25 DIAGNOSIS — E10.65 TYPE 1 DIABETES MELLITUS WITH HYPERGLYCEMIA (H): Primary | ICD-10-CM

## 2023-10-25 DIAGNOSIS — E78.5 HYPERLIPIDEMIA LDL GOAL <100: ICD-10-CM

## 2023-10-25 LAB
ANION GAP SERPL CALCULATED.3IONS-SCNC: 12 MMOL/L (ref 7–15)
BUN SERPL-MCNC: 13.8 MG/DL (ref 6–20)
CALCIUM SERPL-MCNC: 9.1 MG/DL (ref 8.6–10)
CHLORIDE SERPL-SCNC: 101 MMOL/L (ref 98–107)
CHOLEST SERPL-MCNC: 156 MG/DL
CREAT SERPL-MCNC: 1.04 MG/DL (ref 0.67–1.17)
CREAT UR-MCNC: 328.9 MG/DL
DEPRECATED HCO3 PLAS-SCNC: 24 MMOL/L (ref 22–29)
EGFRCR SERPLBLD CKD-EPI 2021: >90 ML/MIN/1.73M2
GLUCOSE SERPL-MCNC: 186 MG/DL (ref 70–99)
HBA1C MFR BLD: 10.7 %
HDLC SERPL-MCNC: 51 MG/DL
LDLC SERPL CALC-MCNC: 92 MG/DL
MICROALBUMIN UR-MCNC: 15.9 MG/L
MICROALBUMIN/CREAT UR: 4.83 MG/G CR (ref 0–17)
NONHDLC SERPL-MCNC: 105 MG/DL
POTASSIUM SERPL-SCNC: 4 MMOL/L (ref 3.4–5.3)
SODIUM SERPL-SCNC: 137 MMOL/L (ref 135–145)
TRIGL SERPL-MCNC: 63 MG/DL

## 2023-10-25 PROCEDURE — 36415 COLL VENOUS BLD VENIPUNCTURE: CPT | Performed by: PHYSICIAN ASSISTANT

## 2023-10-25 PROCEDURE — 80061 LIPID PANEL: CPT | Performed by: PHYSICIAN ASSISTANT

## 2023-10-25 PROCEDURE — 83036 HEMOGLOBIN GLYCOSYLATED A1C: CPT | Performed by: PHYSICIAN ASSISTANT

## 2023-10-25 PROCEDURE — 82570 ASSAY OF URINE CREATININE: CPT | Performed by: PHYSICIAN ASSISTANT

## 2023-10-25 PROCEDURE — 80048 BASIC METABOLIC PNL TOTAL CA: CPT | Performed by: PHYSICIAN ASSISTANT

## 2023-10-25 PROCEDURE — 82043 UR ALBUMIN QUANTITATIVE: CPT | Performed by: PHYSICIAN ASSISTANT

## 2023-10-25 PROCEDURE — 99207 PR FOOT EXAM NO CHARGE: CPT | Performed by: PHYSICIAN ASSISTANT

## 2023-10-25 PROCEDURE — 99214 OFFICE O/P EST MOD 30 MIN: CPT | Performed by: PHYSICIAN ASSISTANT

## 2023-10-25 RX ORDER — PROCHLORPERAZINE 25 MG/1
SUPPOSITORY RECTAL
Qty: 1 EACH | Refills: 0 | Status: SHIPPED | OUTPATIENT
Start: 2023-10-25

## 2023-10-25 RX ORDER — LANCETS
EACH MISCELLANEOUS
Qty: 100 EACH | Refills: 6 | Status: SHIPPED | OUTPATIENT
Start: 2023-10-25

## 2023-10-25 RX ORDER — PROCHLORPERAZINE 25 MG/1
SUPPOSITORY RECTAL
Qty: 1 EACH | Refills: 1 | Status: SHIPPED | OUTPATIENT
Start: 2023-10-25 | End: 2024-05-24

## 2023-10-25 RX ORDER — PROCHLORPERAZINE 25 MG/1
SUPPOSITORY RECTAL
Qty: 3 EACH | Refills: 5 | Status: SHIPPED | OUTPATIENT
Start: 2023-10-25 | End: 2024-08-02

## 2023-10-25 RX ORDER — INSULIN ASPART 100 [IU]/ML
INJECTION, SOLUTION INTRAVENOUS; SUBCUTANEOUS
Qty: 90 ML | Refills: 1 | Status: SHIPPED | OUTPATIENT
Start: 2023-10-25 | End: 2023-11-15

## 2023-10-25 ASSESSMENT — PAIN SCALES - GENERAL: PAINLEVEL: NO PAIN (0)

## 2023-10-25 NOTE — PROGRESS NOTES
"      Renny Geller is a 33 year old, presenting for the following health issues:  Diabetes      10/25/2023     8:35 AM   Additional Questions   Roomed by Olya RIOS   Accompanied by Daughter       History of Present Illness       Diabetes:   He presents for follow up of diabetes.  He is checking home blood glucose a few times a month.   He checks blood glucose at bedtime.  Blood glucose is sometimes over 200 and sometimes under 70. He is aware of hypoglycemia symptoms including shakiness, weakness and other.   He is concerned about blood sugar frequently over 200.    He is not experiencing numbness or burning in feet, excessive thirst, blurry vision, weight changes or redness, sores or blisters on feet. The patient has not had a diabetic eye exam in the last 12 months.          He eats 0-1 servings of fruits and vegetables daily.He consumes 2 sweetened beverage(s) daily.He exercises with enough effort to increase his heart rate 30 to 60 minutes per day.  He exercises with enough effort to increase his heart rate 4 days per week. He is missing 1 dose(s) of medications per week.  He is not taking prescribed medications regularly due to other.    Patient unfortunately has been overall non-compliant with his diabetes. Last set of labs was nearly a year ago. He reports he lost his meter so has not been routinely checking his sugars. He is doing 80 units daily by pump but otherwise not really adjusting sliding scale due to not having accurate account of his sugars. He is interested in a Dexcom. He has not seen the eye doctor recently. He does note that he can often feel when his sugars are high - irritable, nauseated.     Review of Systems   Constitutional, HEENT, cardiovascular, pulmonary, GI, , musculoskeletal, neuro, skin, endocrine and psych systems are negative, except as otherwise noted.      Objective    /70   Pulse 80   Temp 97.4  F (36.3  C) (Temporal)   Resp 20   Ht 1.81 m (5' 11.26\")   Wt " 101.6 kg (224 lb)   SpO2 98%   BMI 31.01 kg/m    Body mass index is 31.01 kg/m .  Physical Exam   GENERAL: healthy, alert and no distress  EYES: Eyes grossly normal to inspection, PERRL and conjunctivae and sclerae normal  HENT: ear canals and TM's normal, nose and mouth without ulcers or lesions  NECK: no adenopathy, no asymmetry, masses, or scars and thyroid normal to palpation  RESP: lungs clear to auscultation - no rales, rhonchi or wheezes  CV: regular rate and rhythm, normal S1 S2, no S3 or S4, no murmur, click or rub, no peripheral edema and peripheral pulses strong  ABDOMEN: soft, nontender, no hepatosplenomegaly, no masses and bowel sounds normal  MS: no gross musculoskeletal defects noted, no edema  SKIN: no suspicious lesions or rashes  PSYCH: mentation appears normal, affect normal/bright  Diabetic foot exam: normal DP and PT pulses, no trophic changes or ulcerative lesions, and normal sensory exam      Assessment & Plan     Type 1 diabetes mellitus with hyperglycemia (H)  As noted above patient has been rather non-compliant with his diabetes control. Has not been checking his sugars due to losing his meter. Last set of labs nearly a year ago and A1c at that time was 11.4. Discussed with patient the risks of poorly controlled diabetes. Recommended seeing diabetes education again to help with better blood sugar control. Follow-up in 3 months, sooner if needed.   - HEMOGLOBIN A1C; Future  - BASIC METABOLIC PANEL; Future  - Lipid panel reflex to direct LDL Non-fasting; Future  - Albumin Random Urine Quantitative with Creat Ratio; Future  - FOOT EXAM  - blood glucose monitoring (NO BRAND SPECIFIED) meter device kit; Use to test blood sugar 4 times daily or as directed. Preferred blood glucose meter OR supplies to accompany: Blood Glucose Monitor Brands: per insurance.  - blood glucose (NO BRAND SPECIFIED) test strip; Use to test blood sugar 4 times daily or as directed. To accompany: Blood Glucose Monitor  "Brands: per insurance.  - thin (NO BRAND SPECIFIED) lancets; Use with lanceting device. To accompany: Blood Glucose Monitor Brands: per insurance.  - insulin aspart (NOVOLOG FLEXPEN) 100 UNIT/ML pen; Inject 80 units subcutaneously daily per insulin pump and sliding scale as needed  - Continuous Blood Gluc  (DEXCOM G6 ) DAR; Use to read blood sugars as per 's instructions.  - Continuous Blood Gluc Sensor (DEXCOM G6 SENSOR) MISC; Change every 10 days.  - Continuous Blood Gluc Transmit (DEXCOM G6 TRANSMITTER) MISC; Change every 3 months.  - HEMOGLOBIN A1C  - BASIC METABOLIC PANEL  - Lipid panel reflex to direct LDL Non-fasting  - Albumin Random Urine Quantitative with Creat Ratio  - AMB Adult Diabetes Educator Referral; Future    Hyperlipidemia LDL goal <100  Labs as above.     BMI:   Estimated body mass index is 31.01 kg/m  as calculated from the following:    Height as of this encounter: 1.81 m (5' 11.26\").    Weight as of this encounter: 101.6 kg (224 lb).   Weight management plan: Discussed healthy diet and exercise guidelines    The patient indicates understanding of these issues and agrees with the plan.    Alma Larose PA-C  [facility]        "

## 2023-10-26 ENCOUNTER — TELEPHONE (OUTPATIENT)
Dept: FAMILY MEDICINE | Facility: CLINIC | Age: 33
End: 2023-10-26

## 2023-10-26 NOTE — TELEPHONE ENCOUNTER
Prior Authorization Retail Medication Request    Medication/Dose: Continuous Blood Gluc Sensor (DEXCOM G6 SENSOR) MISC  ICD code (if different than what is on RX):    Type 1 diabetes mellitus with hyperglycemia (H) [E10.65]  - Primary     Previously Tried and Failed:    Rationale:      Insurance Name:  [employer]HLIE   Insurance ID:  [id number]       Pharmacy Information (if different than what is on RX)  Name:    [id number] - [place], [place] - [address] [place] [address] AVENUE      Phone:  [phone]

## 2023-10-26 NOTE — TELEPHONE ENCOUNTER
Prior Authorization Retail Medication Request    Medication/Dose: Continuous Blood Gluc  (DEXCOM G6 ) DAR  ICD code (if different than what is on RX):    Type 1 diabetes mellitus with hyperglycemia (H) [E10.65]  - Primary     Previously Tried and Failed:    Rationale:      Insurance Name:      [employer] [place]     Insurance ID:  [id number]       Pharmacy Information (if different than what is on RX)  Name:  [id number] - [place], [place] - [facility] [address]    Phone:  [phone]

## 2023-10-26 NOTE — TELEPHONE ENCOUNTER
Prior Authorization Retail Medication Request    Medication/Dose: Continuous Blood Gluc Transmit (DEXCOM G6 TRANSMITTER) MISC  ICD code (if different than what is on RX):    Type 1 diabetes mellitus with hyperglycemia (H) [E10.65]  - Primary        Previously Tried and Failed:    Rationale:      Insurance Name: [employer]SHLIE    Insurance ID:  [id number]       Pharmacy Information (if different than what is on RX)  Name:  [id number] [address]    Phone:  [phone]

## 2023-10-30 NOTE — TELEPHONE ENCOUNTER
Central Prior Authorization Team - Phone: [phone]     PA Initiation    Medication: DEXCOM G6 SENSOR MISC  Insurance Company: [employer] ([facility]) - Phone [phone] Fax [phone]  Pharmacy Filling the Rx: [id number] - [place], [place] - [address] [place] [address]  Filling Pharmacy Phone: [phone]  Filling Pharmacy Fax:    Start Date: 10/30/2023

## 2023-10-30 NOTE — TELEPHONE ENCOUNTER
Central Prior Authorization Team - Phone: [phone]     PA Initiation    Medication: DEXCOM G6 TRANSMITTER MISC  Insurance Company: [employer] ([facility]) - Phone [phone] Fax [phone]  Pharmacy Filling the Rx: [id number] - [place], [place] - [address] [place] [address]  Filling Pharmacy Phone: [phone]  Filling Pharmacy Fax:    Start Date: 10/30/2023

## 2023-10-30 NOTE — TELEPHONE ENCOUNTER
Central Prior Authorization Team - Phone: [phone]     PA Initiation    Medication: DEXCOM G6  DAR  Insurance Company: [employer] ([facility]) - Phone [phone] Fax [phone]  Pharmacy Filling the Rx: [id number] - [place], [place] - [address] [place] [address]  Filling Pharmacy Phone: [phone]  Filling Pharmacy Fax:    Start Date: 10/30/2023

## 2023-10-31 ENCOUNTER — TELEPHONE (OUTPATIENT)
Dept: FAMILY MEDICINE | Facility: CLINIC | Age: 33
End: 2023-10-31
Payer: COMMERCIAL

## 2023-10-31 NOTE — TELEPHONE ENCOUNTER
Central Prior Authorization Team - Phone: [phone]     Prior Authorization Approval    Medication: DEXCOM G6 TRANSMITTER MISC  Authorization Effective Date: 10/30/2023  Authorization Expiration Date: 10/30/2025  Approved Dose/Quantity: 1  Reference #:     Insurance Company: [employer] ([facility]) - Phone [phone] Fax [phone]  Expected CoPay: $    CoPay Card Available:      Financial Assistance Needed:   Which Pharmacy is filling the prescription: [id number] - [place] - [address] [place] [address]  Pharmacy Notified: YES  Patient Notified: YES PHARMACY WILL NOTIFY WHEN READY

## 2023-10-31 NOTE — TELEPHONE ENCOUNTER
Central Prior Authorization Team - Phone: [phone]     Prior Authorization Approval    Medication: DEXCOM G6 SENSOR MISC  Authorization Effective Date: 10/30/2023  Authorization Expiration Date: 10/30/2025  Approved Dose/Quantity: 3  Reference #:     Insurance Company: [employer] ([facility]) - Phone [phone] Fax [phone]  Expected CoPay: $    CoPay Card Available:      Financial Assistance Needed:   Which Pharmacy is filling the prescription: [id number] - [place] - [address] [place] [address]  Pharmacy Notified: YES  Patient Notified: YES PHARMACY WILL NOTIFY WHEN READY

## 2023-10-31 NOTE — TELEPHONE ENCOUNTER
Called and LM for patient to call back. Please results below.   Olya Mercedes MA     A1c is still higher than desired. I would recommend connecting with diabetes education again to help improve this. I placed a referral. I would recommend a follow-up visit with labs in 3 months. Please let me know if you have any questions/concerns.     Alma Larose

## 2023-10-31 NOTE — LETTER
November 2, 2023      Yimióscar Rodriguez  [address]  [facility] [address]        Dear ,    We are writing to inform you of your test results.    A1c is still higher than desired. I would recommend connecting with diabetes education again to help improve this. I placed a referral. I would recommend a follow-up visit with labs in 3 months. Please let me know if you have any questions/concerns.     Alma Larose    Resulted Orders   HEMOGLOBIN A1C   Result Value Ref Range    Hemoglobin A1C 10.7 (H) <5.7 %      Comment:      Normal <5.7%   Prediabetes 5.7-6.4%    Diabetes 6.5% or higher     Note: Adopted from ADA consensus guidelines.   BASIC METABOLIC PANEL   Result Value Ref Range    Sodium 137 135 - 145 mmol/L      Comment:      Reference intervals for this test were updated on 09/26/2023 to more accurately reflect our healthy population. There may be differences in the flagging of prior results with similar values performed with this method. Interpretation of those prior results can be made in the context of the updated reference intervals.     Potassium 4.0 3.4 - 5.3 mmol/L    Chloride 101 98 - 107 mmol/L    Carbon Dioxide (CO2) 24 22 - 29 mmol/L    Anion Gap 12 7 - 15 mmol/L    Urea Nitrogen 13.8 6.0 - 20.0 mg/dL    Creatinine 1.04 0.67 - 1.17 mg/dL    GFR Estimate >90 >60 mL/min/1.73m2    Calcium 9.1 8.6 - 10.0 mg/dL    Glucose 186 (H) 70 - 99 mg/dL   Lipid panel reflex to direct LDL Non-fasting   Result Value Ref Range    Cholesterol 156 <200 mg/dL    Triglycerides 63 <150 mg/dL    Direct Measure HDL 51 >=40 mg/dL    LDL Cholesterol Calculated 92 <=100 mg/dL    Non HDL Cholesterol 105 <130 mg/dL    Narrative    Cholesterol  Desirable:  <200 mg/dL    Triglycerides  Normal:  Less than 150 mg/dL  Borderline High:  150-199 mg/dL  High:  200-499 mg/dL  Very High:  Greater than or equal to 500 mg/dL    Direct Measure HDL  Female:  Greater than or equal to 50 mg/dL   Male:  Greater than or equal to 40  mg/dL    LDL Cholesterol  Desirable:  <100mg/dL  Above Desirable:  100-129 mg/dL   Borderline High:  130-159 mg/dL   High:  160-189 mg/dL   Very High:  >= 190 mg/dL    Non HDL Cholesterol  Desirable:  130 mg/dL  Above Desirable:  130-159 mg/dL  Borderline High:  160-189 mg/dL  High:  190-219 mg/dL  Very High:  Greater than or equal to 220 mg/dL   Albumin Random Urine Quantitative with Creat Ratio   Result Value Ref Range    Creatinine Urine mg/dL 328.9 mg/dL      Comment:      The reference ranges have not been established in urine creatinine. The results should be integrated into the clinical context for interpretation.    Albumin Urine mg/L 15.9 mg/L      Comment:      The reference ranges have not been established in urine albumin. The results should be integrated into the clinical context for interpretation.    Albumin Urine mg/g Cr 4.83 0.00 - 17.00 mg/g Cr      Comment:      Microalbuminuria is defined as an albumin:creatinine ratio of 17 to 299 for males and 25 to 299 for females. A ratio of albumin:creatinine of 300 or higher is indicative of overt proteinuria.  Due to biologic variability, positive results should be confirmed by a second, first-morning random or 24-hour timed urine specimen. If there is discrepancy, a third specimen is recommended. When 2 out of 3 results are in the microalbuminuria range, this is evidence for incipient nephropathy and warrants increased efforts at glucose control, blood pressure control, and institution of therapy with an angiotensin-converting-enzyme (ACE) inhibitor (if the patient can tolerate it).

## 2023-10-31 NOTE — TELEPHONE ENCOUNTER
----- Message from Alma Larose PA-C sent at 10/30/2023  6:21 AM CDT -----  Please notify patient/parent of result as [employer] Intelligent Technology[employer] message not read.    Alma Larose PA-C

## 2023-10-31 NOTE — TELEPHONE ENCOUNTER
Central Prior Authorization Team - Phone: [phone]     Prior Authorization Approval    Medication: DEXCOM G6  DAR  Authorization Effective Date: 10/30/2023  Authorization Expiration Date: 10/30/2025  Approved Dose/Quantity: 1  Reference #:     Insurance Company: [employer] ([facility]) - Phone [phone] Fax [phone]  Expected CoPay: $    CoPay Card Available:      Financial Assistance Needed:   Which Pharmacy is filling the prescription: [id number] - [place] - [address] [place] [address]  Pharmacy Notified: YES  Patient Notified: YES PHARMACY WILL NOTIFY WHEN READY

## 2023-11-02 NOTE — TELEPHONE ENCOUNTER
2nd attempt, called and LM for patient to call back. Letter sent as well with results.   Closing encounter.   Olya Mercedes MA

## 2023-11-15 ENCOUNTER — TELEPHONE (OUTPATIENT)
Dept: FAMILY MEDICINE | Facility: CLINIC | Age: 33
End: 2023-11-15
Payer: COMMERCIAL

## 2023-11-15 DIAGNOSIS — E10.65 TYPE 1 DIABETES MELLITUS WITH HYPERGLYCEMIA (H): ICD-10-CM

## 2023-11-15 RX ORDER — INSULIN ASPART 100 [IU]/ML
INJECTION, SOLUTION INTRAVENOUS; SUBCUTANEOUS
Qty: 90 ML | Refills: 1 | Status: SHIPPED | OUTPATIENT
Start: 2023-11-15 | End: 2024-04-29

## 2023-11-16 NOTE — TELEPHONE ENCOUNTER
Pharmacy calling needing clarification on maximum dosage per day on the Novolog. Directions state Inject 80 units subcutaneously daily per insulin pump and sliding scale as needed      Needing for insurance. Will need new prescription placed with updated instructions with max dosing per day. Jonelle Arreguin LPN    
Updated order sent.    Alma Larose PA-C    
negative...

## 2024-02-04 ENCOUNTER — HEALTH MAINTENANCE LETTER (OUTPATIENT)
Age: 34
End: 2024-02-04

## 2024-04-05 ENCOUNTER — OFFICE VISIT (OUTPATIENT)
Dept: FAMILY MEDICINE | Facility: CLINIC | Age: 34
End: 2024-04-05
Attending: PHYSICIAN ASSISTANT
Payer: COMMERCIAL

## 2024-04-05 VITALS
OXYGEN SATURATION: 98 % | SYSTOLIC BLOOD PRESSURE: 130 MMHG | WEIGHT: 247.2 LBS | BODY MASS INDEX: 34.61 KG/M2 | TEMPERATURE: 97.6 F | HEART RATE: 90 BPM | RESPIRATION RATE: 20 BRPM | HEIGHT: 71 IN | DIASTOLIC BLOOD PRESSURE: 70 MMHG

## 2024-04-05 DIAGNOSIS — E10.65 TYPE 1 DIABETES MELLITUS WITH HYPERGLYCEMIA (H): Primary | ICD-10-CM

## 2024-04-05 DIAGNOSIS — E66.811 CLASS 1 OBESITY DUE TO EXCESS CALORIES WITH SERIOUS COMORBIDITY AND BODY MASS INDEX (BMI) OF 34.0 TO 34.9 IN ADULT: ICD-10-CM

## 2024-04-05 DIAGNOSIS — E66.09 CLASS 1 OBESITY DUE TO EXCESS CALORIES WITH SERIOUS COMORBIDITY AND BODY MASS INDEX (BMI) OF 34.0 TO 34.9 IN ADULT: ICD-10-CM

## 2024-04-05 DIAGNOSIS — E78.5 HYPERLIPIDEMIA LDL GOAL <100: ICD-10-CM

## 2024-04-05 LAB — HBA1C MFR BLD: 8.9 %

## 2024-04-05 PROCEDURE — 83036 HEMOGLOBIN GLYCOSYLATED A1C: CPT | Performed by: PHYSICIAN ASSISTANT

## 2024-04-05 PROCEDURE — 36415 COLL VENOUS BLD VENIPUNCTURE: CPT | Performed by: PHYSICIAN ASSISTANT

## 2024-04-05 PROCEDURE — 99213 OFFICE O/P EST LOW 20 MIN: CPT | Performed by: PHYSICIAN ASSISTANT

## 2024-04-05 ASSESSMENT — PAIN SCALES - GENERAL: PAINLEVEL: NO PAIN (0)

## 2024-04-05 NOTE — PROGRESS NOTES
Renny Geller is a 33 year old, presenting for the following health issues:  Diabetes      4/5/2024    12:42 PM   Additional Questions   Roomed by Olya RIOS   Accompanied by Daughter     History of Present Illness       Heart Failure:  He presents for follow up of heart failure. He is not experiencing shortness of breath at night, with rest or with activity  He is not experiencing any lower extremity edema.   He denies orthopenea and is not coughing at night. Patient is not checking weight daily. He has recently had a weight increase.  He has no side effects from medications.  He has had no other medical visits for heart failure since the last visit.      Diabetes Follow-up    How often are you checking your blood sugar? Continuous glucose monitor  What time of day are you checking your blood sugars (select all that apply)?  Before and after meals  Have you had any blood sugars above 200?  Yes 330  Have you had any blood sugars below 70? maybe  What symptoms do you notice when your blood sugar is low?  Lethargy and Other: hungry  What concerns do you have today about your diabetes? None   Do you have any of these symptoms? (Select all that apply)  No numbness or tingling in feet.  No redness, sores or blisters on feet.  No complaints of excessive thirst.  No reports of blurry vision.  No significant changes to weight.  Have you had a diabetic eye exam in the last 12 months? No    Patient presents today for diabetes follow-up. He recently started using the Dexcom to help better monitor his blood sugars. He does note this has been making a big difference in ability to adjust his pump settings. Wife is attached to his Dexcom as well so helps to keep him accountable. He is trying to incorporate more exercise. Sugars are still high but not as bad as they previously were. He is due to see an eye doctor.    BP Readings from Last 2 Encounters:   04/05/24 130/70   10/25/23 122/70     Hemoglobin A1C (%)   Date Value  "  10/25/2023 10.7 (H)   10/20/2022 11.4 (H)   02/18/2021 11.0 (H)   10/29/2019 9.1 (H)     LDL Cholesterol Calculated (mg/dL)   Date Value   10/25/2023 92   01/24/2022 73   02/18/2021 95   10/29/2019 89     LDL Cholesterol Direct (mg/dL)   Date Value   10/29/2019 82               Review of Systems  Constitutional, HEENT, cardiovascular, pulmonary, GI, , musculoskeletal, neuro, skin, endocrine and psych systems are negative, except as otherwise noted.      Objective    /70   Pulse 90   Temp 97.6  F (36.4  C) (Temporal)   Resp 20   Ht 1.816 m (5' 11.5\")   Wt 112.1 kg (247 lb 3.2 oz)   SpO2 98%   BMI 34.00 kg/m    Body mass index is 34 kg/m .  Physical Exam   GENERAL: alert and no distress  EYES: Eyes grossly normal to inspection, PERRL and conjunctivae and sclerae normal  HENT: ear canals and TM's normal, nose and mouth without ulcers or lesions  NECK: no adenopathy, no asymmetry, masses, or scars  RESP: lungs clear to auscultation - no rales, rhonchi or wheezes  CV: regular rate and rhythm, normal S1 S2, no S3 or S4, no murmur, click or rub, no peripheral edema   MS: no gross musculoskeletal defects noted, no edema  SKIN: no suspicious lesions or rashes  PSYCH: mentation appears normal, affect normal/bright        Assessment & Plan     Type 1 diabetes mellitus with hyperglycemia (H)  Due for A1c today. Per patient report blood sugars are improving now that he has been able to monitor these more closely. He will continue to adjust pump settings. Discussed consideration of upgrading pump in the future if desired. Declined at this time. Encouraged ongoing diet and exercise modifications. Due for eye exam.   - HEMOGLOBIN A1C; Future  - HEMOGLOBIN A1C    Hyperlipidemia LDL goal <100  Continue monitoring.    The patient indicates understanding of these issues and agrees with the plan.    Signed Electronically by: Alma Larose PA-C    "

## 2024-04-07 PROBLEM — E66.811 CLASS 1 OBESITY DUE TO EXCESS CALORIES WITH SERIOUS COMORBIDITY AND BODY MASS INDEX (BMI) OF 34.0 TO 34.9 IN ADULT: Status: ACTIVE | Noted: 2024-04-07

## 2024-04-07 PROBLEM — E66.09 CLASS 1 OBESITY DUE TO EXCESS CALORIES WITH SERIOUS COMORBIDITY AND BODY MASS INDEX (BMI) OF 34.0 TO 34.9 IN ADULT: Status: ACTIVE | Noted: 2024-04-07

## 2024-04-07 PROBLEM — L60.0 INGROWN NAIL: Status: RESOLVED | Noted: 2018-02-26 | Resolved: 2024-04-07

## 2024-04-29 ENCOUNTER — MYC MEDICAL ADVICE (OUTPATIENT)
Dept: FAMILY MEDICINE | Facility: CLINIC | Age: 34
End: 2024-04-29
Payer: COMMERCIAL

## 2024-04-29 DIAGNOSIS — E10.65 TYPE 1 DIABETES MELLITUS WITH HYPERGLYCEMIA (H): ICD-10-CM

## 2024-04-29 RX ORDER — INSULIN ASPART 100 [IU]/ML
INJECTION, SOLUTION INTRAVENOUS; SUBCUTANEOUS
Qty: 90 ML | Refills: 1 | Status: SHIPPED | OUTPATIENT
Start: 2024-04-29

## 2024-04-30 NOTE — TELEPHONE ENCOUNTER
Novolog Refill - sent to [facility] in [place] on 04-    Sent [email or web address] message    Leora Valle RN on 4/30/2024 at 10:21 AM

## 2024-05-14 ENCOUNTER — E-VISIT (OUTPATIENT)
Dept: FAMILY MEDICINE | Facility: CLINIC | Age: 34
End: 2024-05-14
Payer: COMMERCIAL

## 2024-05-14 DIAGNOSIS — F90.9 ATTENTION DEFICIT HYPERACTIVITY DISORDER (ADHD), UNSPECIFIED ADHD TYPE: Primary | ICD-10-CM

## 2024-05-14 PROCEDURE — 99207 PR NON-BILLABLE SERV PER CHARTING: CPT | Performed by: PHYSICIAN ASSISTANT

## 2024-05-17 ENCOUNTER — TELEPHONE (OUTPATIENT)
Dept: FAMILY MEDICINE | Facility: CLINIC | Age: 34
End: 2024-05-17
Payer: COMMERCIAL

## 2024-05-17 NOTE — TELEPHONE ENCOUNTER
Forms/Letter Request    Type of form/letter: OTHER: patient dropped off ADHD testing results for provider to view.       Do we have the form/letter: Yes    Who is the form from? Patient    Where did/will the form come from? Patient or family brought in       When is form/letter needed by: FOR PROVIDER TO VIEW THEN SEND TO SCANNING

## 2024-05-20 ENCOUNTER — MYC MEDICAL ADVICE (OUTPATIENT)
Dept: FAMILY MEDICINE | Facility: CLINIC | Age: 34
End: 2024-05-20
Payer: COMMERCIAL

## 2024-05-21 ENCOUNTER — VIRTUAL VISIT (OUTPATIENT)
Dept: FAMILY MEDICINE | Facility: CLINIC | Age: 34
End: 2024-05-21
Payer: COMMERCIAL

## 2024-05-21 DIAGNOSIS — F90.9 ATTENTION DEFICIT HYPERACTIVITY DISORDER (ADHD), UNSPECIFIED ADHD TYPE: Primary | ICD-10-CM

## 2024-05-21 PROCEDURE — 99441 PR PHYSICIAN TELEPHONE EVALUATION 5-10 MIN: CPT | Mod: 93 | Performed by: PHYSICIAN ASSISTANT

## 2024-05-21 RX ORDER — DEXTROAMPHETAMINE SACCHARATE, AMPHETAMINE ASPARTATE MONOHYDRATE, DEXTROAMPHETAMINE SULFATE AND AMPHETAMINE SULFATE 2.5; 2.5; 2.5; 2.5 MG/1; MG/1; MG/1; MG/1
10 CAPSULE, EXTENDED RELEASE ORAL DAILY
Qty: 30 CAPSULE | Refills: 0 | Status: SHIPPED | OUTPATIENT
Start: 2024-05-21

## 2024-05-21 NOTE — PROGRESS NOTES
Yimi is a 33 year old who is being evaluated via a billable telephone visit.    What phone number would you like to be contacted at?   How would you like to obtain your AVS? MyChart  Originating Location (pt. Location): Home    Distant Location (provider location):  On-site      Subjective   Yimi is a 33 year old, presenting for the following health issues:  RODGER      5/21/2024     5:02 PM   Additional Questions   Roomed by Sapna SOARES       Patient newly diagnosed with ADHD. He has felt he likely had this since childhood. Recently one of his children were diagnosed prompting testing for him. He reports having multiple projects started without completing these. He often feels distracted. Sometimes restless. He is interested in starting treatment for this.      Review of Systems  Constitutional, HEENT, cardiovascular, pulmonary, GI, , musculoskeletal, neuro, skin, endocrine and psych systems are negative, except as otherwise noted.      Objective           Vitals:  No vitals were obtained today due to virtual visit.    Physical Exam   General: Alert and no distress //Respiratory: No audible wheeze, cough, or shortness of breath // Psychiatric:  Appropriate affect, tone, and pace of words    Assessment & Plan     Attention deficit hyperactivity disorder (ADHD), unspecified ADHD type  Discussed ADHD treatment options at length today including associated risks and benefits. Elected to start Adderall XR 10 mg as below.  Appropriate use, side effects and dose titration if needed discussed. Follow-up in 1-2 months encouraged.   - amphetamine-dextroamphetamine (ADDERALL XR) 10 MG 24 hr capsule; Take 1 capsule (10 mg) by mouth daily    The patient indicates understanding of these issues and agrees with the plan.        Phone call duration: 7 minutes  Signed Electronically by: Alma Larose PA-C

## 2024-05-24 DIAGNOSIS — E10.65 TYPE 1 DIABETES MELLITUS WITH HYPERGLYCEMIA (H): ICD-10-CM

## 2024-05-24 RX ORDER — PROCHLORPERAZINE 25 MG/1
SUPPOSITORY RECTAL
Qty: 1 EACH | Refills: 1 | Status: SHIPPED | OUTPATIENT
Start: 2024-05-24

## 2024-08-02 DIAGNOSIS — E10.65 TYPE 1 DIABETES MELLITUS WITH HYPERGLYCEMIA (H): ICD-10-CM

## 2024-08-02 RX ORDER — PROCHLORPERAZINE 25 MG/1
SUPPOSITORY RECTAL
Qty: 3 EACH | Refills: 5 | Status: SHIPPED | OUTPATIENT
Start: 2024-08-02

## 2024-08-16 DIAGNOSIS — F90.9 ATTENTION DEFICIT HYPERACTIVITY DISORDER (ADHD), UNSPECIFIED ADHD TYPE: ICD-10-CM

## 2024-08-18 RX ORDER — DEXTROAMPHETAMINE SACCHARATE, AMPHETAMINE ASPARTATE MONOHYDRATE, DEXTROAMPHETAMINE SULFATE AND AMPHETAMINE SULFATE 3.75; 3.75; 3.75; 3.75 MG/1; MG/1; MG/1; MG/1
15 CAPSULE, EXTENDED RELEASE ORAL DAILY
Qty: 30 CAPSULE | Refills: 0 | Status: SHIPPED | OUTPATIENT
Start: 2024-08-18 | End: 2024-09-17

## 2024-09-01 ENCOUNTER — HEALTH MAINTENANCE LETTER (OUTPATIENT)
Age: 34
End: 2024-09-01

## 2024-10-16 DIAGNOSIS — F90.9 ATTENTION DEFICIT HYPERACTIVITY DISORDER (ADHD), UNSPECIFIED ADHD TYPE: ICD-10-CM

## 2024-10-16 RX ORDER — DEXTROAMPHETAMINE SACCHARATE, AMPHETAMINE ASPARTATE MONOHYDRATE, DEXTROAMPHETAMINE SULFATE AND AMPHETAMINE SULFATE 3.75; 3.75; 3.75; 3.75 MG/1; MG/1; MG/1; MG/1
15 CAPSULE, EXTENDED RELEASE ORAL DAILY
Qty: 30 CAPSULE | Refills: 0 | Status: SHIPPED | OUTPATIENT
Start: 2024-10-16 | End: 2024-11-13

## 2024-11-12 SDOH — HEALTH STABILITY: PHYSICAL HEALTH: ON AVERAGE, HOW MANY DAYS PER WEEK DO YOU ENGAGE IN MODERATE TO STRENUOUS EXERCISE (LIKE A BRISK WALK)?: 2 DAYS

## 2024-11-12 SDOH — HEALTH STABILITY: PHYSICAL HEALTH: ON AVERAGE, HOW MANY MINUTES DO YOU ENGAGE IN EXERCISE AT THIS LEVEL?: 30 MIN

## 2024-11-12 ASSESSMENT — SOCIAL DETERMINANTS OF HEALTH (SDOH): HOW OFTEN DO YOU GET TOGETHER WITH FRIENDS OR RELATIVES?: ONCE A WEEK

## 2024-11-13 ENCOUNTER — OFFICE VISIT (OUTPATIENT)
Dept: FAMILY MEDICINE | Facility: CLINIC | Age: 34
End: 2024-11-13
Payer: COMMERCIAL

## 2024-11-13 VITALS
WEIGHT: 230 LBS | HEART RATE: 82 BPM | RESPIRATION RATE: 20 BRPM | DIASTOLIC BLOOD PRESSURE: 74 MMHG | OXYGEN SATURATION: 98 % | BODY MASS INDEX: 32.2 KG/M2 | SYSTOLIC BLOOD PRESSURE: 126 MMHG | TEMPERATURE: 97.3 F | HEIGHT: 71 IN

## 2024-11-13 DIAGNOSIS — E10.65 TYPE 1 DIABETES MELLITUS WITH HYPERGLYCEMIA (H): ICD-10-CM

## 2024-11-13 DIAGNOSIS — F90.9 ATTENTION DEFICIT HYPERACTIVITY DISORDER (ADHD), UNSPECIFIED ADHD TYPE: ICD-10-CM

## 2024-11-13 DIAGNOSIS — Z00.00 ROUTINE GENERAL MEDICAL EXAMINATION AT A HEALTH CARE FACILITY: Primary | ICD-10-CM

## 2024-11-13 DIAGNOSIS — K21.9 GASTROESOPHAGEAL REFLUX DISEASE WITHOUT ESOPHAGITIS: ICD-10-CM

## 2024-11-13 LAB
ANION GAP SERPL CALCULATED.3IONS-SCNC: 7 MMOL/L (ref 7–15)
BUN SERPL-MCNC: 11.1 MG/DL (ref 6–20)
CALCIUM SERPL-MCNC: 9.1 MG/DL (ref 8.8–10.4)
CHLORIDE SERPL-SCNC: 104 MMOL/L (ref 98–107)
CHOLEST SERPL-MCNC: 127 MG/DL
CREAT SERPL-MCNC: 1 MG/DL (ref 0.67–1.17)
CREAT UR-MCNC: 196.8 MG/DL
EGFRCR SERPLBLD CKD-EPI 2021: >90 ML/MIN/1.73M2
EST. AVERAGE GLUCOSE BLD GHB EST-MCNC: 203 MG/DL
FASTING STATUS PATIENT QL REPORTED: NO
FASTING STATUS PATIENT QL REPORTED: NO
GLUCOSE SERPL-MCNC: 174 MG/DL (ref 70–99)
HBA1C MFR BLD: 8.7 %
HCO3 SERPL-SCNC: 26 MMOL/L (ref 22–29)
HDLC SERPL-MCNC: 45 MG/DL
LDLC SERPL CALC-MCNC: 64 MG/DL
MICROALBUMIN UR-MCNC: <12 MG/L
MICROALBUMIN/CREAT UR: NORMAL MG/G{CREAT}
NONHDLC SERPL-MCNC: 82 MG/DL
POTASSIUM SERPL-SCNC: 4.1 MMOL/L (ref 3.4–5.3)
SODIUM SERPL-SCNC: 137 MMOL/L (ref 135–145)
TRIGL SERPL-MCNC: 91 MG/DL

## 2024-11-13 PROCEDURE — 82570 ASSAY OF URINE CREATININE: CPT | Performed by: PHYSICIAN ASSISTANT

## 2024-11-13 PROCEDURE — 83036 HEMOGLOBIN GLYCOSYLATED A1C: CPT | Performed by: PHYSICIAN ASSISTANT

## 2024-11-13 PROCEDURE — 80048 BASIC METABOLIC PNL TOTAL CA: CPT | Performed by: PHYSICIAN ASSISTANT

## 2024-11-13 PROCEDURE — 99395 PREV VISIT EST AGE 18-39: CPT | Performed by: PHYSICIAN ASSISTANT

## 2024-11-13 PROCEDURE — 36415 COLL VENOUS BLD VENIPUNCTURE: CPT | Performed by: PHYSICIAN ASSISTANT

## 2024-11-13 PROCEDURE — 99214 OFFICE O/P EST MOD 30 MIN: CPT | Mod: 25 | Performed by: PHYSICIAN ASSISTANT

## 2024-11-13 PROCEDURE — 80061 LIPID PANEL: CPT | Performed by: PHYSICIAN ASSISTANT

## 2024-11-13 PROCEDURE — 82043 UR ALBUMIN QUANTITATIVE: CPT | Performed by: PHYSICIAN ASSISTANT

## 2024-11-13 PROCEDURE — 99207 PR FOOT EXAM NO CHARGE: CPT | Performed by: PHYSICIAN ASSISTANT

## 2024-11-13 RX ORDER — DEXTROAMPHETAMINE SACCHARATE, AMPHETAMINE ASPARTATE MONOHYDRATE, DEXTROAMPHETAMINE SULFATE AND AMPHETAMINE SULFATE 5; 5; 5; 5 MG/1; MG/1; MG/1; MG/1
20 CAPSULE, EXTENDED RELEASE ORAL DAILY
Qty: 30 CAPSULE | Refills: 0 | Status: SHIPPED | OUTPATIENT
Start: 2025-01-12 | End: 2025-02-11

## 2024-11-13 RX ORDER — ACYCLOVIR 400 MG/1
1 TABLET ORAL ONCE
Qty: 1 EACH | Refills: 0 | Status: SHIPPED | OUTPATIENT
Start: 2024-11-13 | End: 2024-11-13

## 2024-11-13 RX ORDER — OMEPRAZOLE 40 MG/1
40 CAPSULE, DELAYED RELEASE ORAL DAILY
Qty: 90 CAPSULE | Refills: 3 | Status: SHIPPED | OUTPATIENT
Start: 2024-11-13

## 2024-11-13 RX ORDER — DEXTROAMPHETAMINE SACCHARATE, AMPHETAMINE ASPARTATE MONOHYDRATE, DEXTROAMPHETAMINE SULFATE AND AMPHETAMINE SULFATE 5; 5; 5; 5 MG/1; MG/1; MG/1; MG/1
20 CAPSULE, EXTENDED RELEASE ORAL DAILY
Qty: 30 CAPSULE | Refills: 0 | Status: SHIPPED | OUTPATIENT
Start: 2024-12-13 | End: 2025-01-12

## 2024-11-13 RX ORDER — DEXTROAMPHETAMINE SACCHARATE, AMPHETAMINE ASPARTATE MONOHYDRATE, DEXTROAMPHETAMINE SULFATE AND AMPHETAMINE SULFATE 5; 5; 5; 5 MG/1; MG/1; MG/1; MG/1
20 CAPSULE, EXTENDED RELEASE ORAL DAILY
Qty: 30 CAPSULE | Refills: 0 | Status: SHIPPED | OUTPATIENT
Start: 2024-11-13 | End: 2024-12-13

## 2024-11-13 RX ORDER — ACYCLOVIR 400 MG/1
1 TABLET ORAL
Qty: 9 EACH | Refills: 5 | Status: SHIPPED | OUTPATIENT
Start: 2024-11-13

## 2024-11-13 RX ORDER — INSULIN ASPART 100 [IU]/ML
INJECTION, SOLUTION INTRAVENOUS; SUBCUTANEOUS
Qty: 90 ML | Refills: 1 | Status: SHIPPED | OUTPATIENT
Start: 2024-11-13

## 2024-11-13 NOTE — PROGRESS NOTES
Preventive Care Visit  [facility]  Alma Larose PA-C, Family Medicine  Nov 13, 2024  {Provider  Link to SmartSet :629245}    {PROVIDER CHARTING PREFERENCE:468506}    Renny Geller is a 34 year old, presenting for the following:  Physical        11/13/2024     1:37 PM   Additional Questions   Roomed by Alma DUNBAR  ***    {SUPERLIST (Optional):684684}  {additonal problems for provider to add (Optional):879105}  Health Care Directive  Patient does not have a Health Care Directive: Discussed advance care planning with patient; information given to patient to review.      11/12/2024   General Health   How would you rate your overall physical health? Good   Feel stress (tense, anxious, or unable to sleep) Only a little      (!) STRESS CONCERN      11/12/2024   Nutrition   Three or more servings of calcium each day? Yes   Diet: Other   If other, please elaborate: No to low caffeine. Mostly water and Milk for hydration   How many servings of fruit and vegetables per day? (!) 0-1   How many sweetened beverages each day? 0-1            11/12/2024   Exercise   Days per week of moderate/strenous exercise 2 days   Average minutes spent exercising at this level 30 min      (!) EXERCISE CONCERN      11/12/2024   Social Factors   Frequency of gathering with friends or relatives Once a week   Worry food won't last until get money to buy more No   Food not last or not have enough money for food? No   Do you have housing? (Housing is defined as stable permanent housing and does not include staying ouside in a car, in a tent, in an abandoned building, in an overnight shelter, or couch-surfing.) Yes   Are you worried about losing your housing? No   Lack of transportation? No   Unable to get utilities (heat,electricity)? No            11/12/2024   Dental   Dentist two times every year? (!) NO            11/12/2024   TB Screening   Were you born outside of the [place]? No        Today's PHQ-2  "Score:       4/5/2024    12:37 PM   PHQ-2 ( 1999 [employer])   Q1: Little interest or pleasure in doing things 0    Q2: Feeling down, depressed or hopeless 0    PHQ-2 Score 0   Q1: Little interest or pleasure in doing things Not at all   Q2: Feeling down, depressed or hopeless Not at all   PHQ-2 Score 0       Patient-reported         11/12/2024   Substance Use   Alcohol more than 3/day or more than 7/wk No   Do you use any other substances recreationally? (!) OTHER        Social History     Tobacco Use    Smoking status: Never     Passive exposure: Yes    Smokeless tobacco: Never    Tobacco comments:     works at [employer] Use    Vaping status: Never Used   Substance Use Topics    Alcohol use: No    Drug use: No     {Provider  If there are gaps in the social history shown above, please follow the link to update and then refresh the note Link to Social and Substance History :360076}      11/12/2024   STI Screening   New sexual partner(s) since last STI/HIV test? No            11/12/2024   Contraception/Family Planning   Questions about contraception or family planning No        {Provider  REQUIRED FOR AWV Use the storyboard to review patient history, after sections have been marked as reviewed, refresh note to capture documentation:813534}   Reviewed and updated as needed this visit by Provider                    {HISTORY OPTIONS (Optional):658734}    {ROS Picklists (Optional):403530}     Objective    Exam  /74   Pulse 82   Temp 97.3  F (36.3  C) (Temporal)   Resp 20   Ht 1.791 m (5' 10.5\")   Wt 104.3 kg (230 lb)   SpO2 98%   BMI 32.54 kg/m     Estimated body mass index is 32.54 kg/m  as calculated from the following:    Height as of this encounter: 1.791 m (5' 10.5\").    Weight as of this encounter: 104.3 kg (230 lb).    Physical Exam  {Exam Choices (Optional):340903}        Signed Electronically by: Alma Larose PA-C  {Email feedback regarding this note to " [email or web address]   :620867}   "LDL Non-fasting  - Albumin Random Urine Quantitative with Creat Ratio  - FOOT EXAM    Gastroesophageal reflux disease without esophagitis  - omeprazole (PRILOSEC) 40 MG DR capsule; Take 1 capsule (40 mg) by mouth daily.    Attention deficit hyperactivity disorder (ADHD), unspecified ADHD type  Stable. Continue current treatment without change.   - amphetamine-dextroamphetamine (ADDERALL XR) 20 MG 24 hr capsule; Take 1 capsule (20 mg) by mouth daily.  - amphetamine-dextroamphetamine (ADDERALL XR) 20 MG 24 hr capsule; Take 1 capsule (20 mg) by mouth daily.  - amphetamine-dextroamphetamine (ADDERALL XR) 20 MG 24 hr capsule; Take 1 capsule (20 mg) by mouth daily.    Patient has been advised of split billing requirements and indicates understanding: Yes        BMI  Estimated body mass index is 32.54 kg/m  as calculated from the following:    Height as of this encounter: 1.791 m (5' 10.5\").    Weight as of this encounter: 104.3 kg (230 lb).   Weight management plan: Discussed healthy diet and exercise guidelines    Counseling  Appropriate preventive services were addressed with this patient via screening, questionnaire, or discussion as appropriate for fall prevention, nutrition, physical activity, Tobacco-use cessation, social engagement, weight loss and cognition.  Checklist reviewing preventive services available has been given to the patient.    Signed Electronically by: Alma Larose PA-C    "

## 2024-11-24 PROBLEM — K21.9 GASTROESOPHAGEAL REFLUX DISEASE WITHOUT ESOPHAGITIS: Status: ACTIVE | Noted: 2024-11-24

## 2024-11-24 PROBLEM — F90.9 ATTENTION DEFICIT HYPERACTIVITY DISORDER (ADHD), UNSPECIFIED ADHD TYPE: Status: ACTIVE | Noted: 2024-11-24

## 2024-11-27 ENCOUNTER — VIRTUAL VISIT (OUTPATIENT)
Dept: EDUCATION SERVICES | Facility: CLINIC | Age: 34
End: 2024-11-27
Attending: PHYSICIAN ASSISTANT
Payer: COMMERCIAL

## 2024-11-27 VITALS — WEIGHT: 230 LBS | BODY MASS INDEX: 31.15 KG/M2 | HEIGHT: 72 IN

## 2024-11-27 DIAGNOSIS — E10.65 TYPE 1 DIABETES MELLITUS WITH HYPERGLYCEMIA (H): ICD-10-CM

## 2024-11-27 RX ORDER — INSULIN PMP CART,AUT,G6/7,CNTR
1 EACH SUBCUTANEOUS
Qty: 1 KIT | Refills: 0 | Status: SHIPPED | OUTPATIENT
Start: 2024-11-27

## 2024-11-27 RX ORDER — INSULIN PMP CART,AUT,G6/7,CNTR
1 EACH SUBCUTANEOUS
Qty: 45 EACH | Refills: 4 | Status: SHIPPED | OUTPATIENT
Start: 2024-11-27

## 2024-11-27 ASSESSMENT — PAIN SCALES - GENERAL: PAINLEVEL_OUTOF10: NO PAIN (0)

## 2024-11-27 NOTE — PROGRESS NOTES
Virtual Visit Details    Type of service:  Video Visit     Originating Location (pt. Location): Home    Distant Location (provider location):  Off-site  Platform used for Video Visit: [employer]    Diabetes Self-Management Education & Support    Kashif Rodriguez presents today for education related to Type 1 diabetes    Patient is being treated with:  Medtronic Insulin Pump  He is accompanied by self    Year of diagnosis: 29 years  Referring provider:  Alma Larose PA-C  Living Situation: wife, 4 kids  Employment: working a  at [employer]    PATIENT CONCERNS/REASON FOR REFERRAL upgrade insulin pump      ASSESSMENT:    Taking Medication:     Current Diabetes Management per Patient:  Taking diabetes medications? yes:     Diabetes Medication(s)       Diabetic Other       glucagon (GLUCAGON EMERGENCY) 1 MG kit Inject 1 mg into the muscle once for 1 dose       Insulin       insulin aspart (NOVOLOG FLEXPEN) 100 UNIT/ML pen Inject 80 units subcutaneously daily per insulin pump and sliding scale as needed. Max 120 units/day            Monitoring  Patient glucose self monitoring as follows: continuously using a continuous glucose monitor (CGM)  BG meter: Contour Next   CGM: Dexcom G7  BG results: see blood glucose log attached to this encounter           Patient's most recent   Lab Results   Component Value Date    A1C 8.7 11/13/2024    A1C 11.0 02/18/2021      Patient's A1C goal: <7.0    Activity: sporadic or irregular exercise, has a rotating schedule at work    Healthy Eating:   carb counts and uses bolus wizard     Problem Solving:      Patient is at risk of hypoglycemia?: YES  Hospitalizations for hyper or hypoglycemia: No    EDUCATION and INSTRUCTION PROVIDED AT THIS VISIT:     We discussed all options for insulin pumps.  Yimi would like to move forward with the OP5.  Will order that through [facility].  We talked about the process to get that set up at pump training.    He uses about 80 units of insulin per  day    Patient-stated goal written and given to Kashif Rodriguez.  Verbalized and demonstrated understanding of instructions.     PLAN:  Order OP5 at [facility]  See patient instructions  AVS printed and given to patient    FOLLOW-UP:    After pump start  Time spent with patient at today's visit was 30 minutes.        Any diabetes medication initiation or dose changes were made [facility] Standing Orders per the patient's referring provider. A copy of this encounter was shared with the provider.

## 2024-11-27 NOTE — NURSING NOTE
Current patient location: [address] [place] [id number]    Is the patient currently in the state of [place]? YES    Visit mode:VIDEO    If the visit is dropped, the patient can be reconnected by:VIDEO VISIT: Text to cell phone:   Telephone Information:   Mobile [phone]       Will anyone else be joining the visit? NO  (If patient encounters technical issues they should call [phone] :[phone])    Are changes needed to the allergy or medication list? No    Are refills needed on medications prescribed by this physician? NO    Rooming Documentation:  Unable to complete questionnaire(s) due to time    Reason for visit: RECHECK    Sophy ALEJANDREF

## 2024-11-27 NOTE — PATIENT INSTRUCTIONS
How to connect to the Endocrinology Department at the [facility] and/or [place]    If you're using the Dexcom Clarity application, please follow the instructions below to ensure a successful connection to our clinic.    *If you do not have the Dexcom Clarity application on your phone, you can also connect to our clinic by going to the webpage [email or web address] ;   log into your Dexcom account and skip to step 4 to connect.      Click Profile :      2.   Click Manage Data Sharing :        3.   Click Continue :         4.   For the [facility] : MHFVDIABETES   For [place] : MHFVDIABETESMG        5.   Click Confirm :       You are now connected with us! No further action is needed.    **If you prefer to generate us a code, please generate a code for 12 months and message our team in [employer] what that code is.        We appreciate your cooperation in making your health a priority.   Please, do not hesitate to contact our team for help in this process. Thank you!      Endocrinolgoy Team  [facility]  [address] [place] [address]  Phone : [phone]  Fax : [phone]

## 2024-12-19 ENCOUNTER — TELEPHONE (OUTPATIENT)
Dept: FAMILY MEDICINE | Facility: CLINIC | Age: 34
End: 2024-12-19
Payer: COMMERCIAL

## 2024-12-19 NOTE — TELEPHONE ENCOUNTER
Patient Quality Outreach    Patient is due for the following:   Diabetes -  Eye Exam      Topic Date Due    Pneumococcal Vaccine (2 of 2 - PCV) 09/27/2014    COVID-19 Vaccine (1 - 2024-25 season) Never done       Action(s) Taken:   Schedule a office visit for eye exam.    Type of outreach:    Sent [employer] message.    Questions for provider review:    None           Queta Palacio MA

## 2025-01-14 NOTE — TELEPHONE ENCOUNTER
Patient Quality Outreach    Patient is due for the following:   Diabetes -  Eye Exam  Depression  -  PHQ-A needed    Action(s) Taken:   Patient was assigned appropriate questionnaire to complete    Type of outreach:    Sent [employer] message.    Questions for provider review:    None           Queta Palacio, VF

## 2025-02-18 ENCOUNTER — MYC MEDICAL ADVICE (OUTPATIENT)
Dept: FAMILY MEDICINE | Facility: CLINIC | Age: 35
End: 2025-02-18
Payer: COMMERCIAL

## 2025-02-18 NOTE — TELEPHONE ENCOUNTER
Routing refill request to provider for review/approval because:  Labs not current:  HgBA1C      YVONNE BrasherN, RN     none

## 2025-03-02 ENCOUNTER — HEALTH MAINTENANCE LETTER (OUTPATIENT)
Age: 35
End: 2025-03-02

## 2025-03-26 DIAGNOSIS — F90.9 ATTENTION DEFICIT HYPERACTIVITY DISORDER (ADHD), UNSPECIFIED ADHD TYPE: ICD-10-CM

## 2025-03-27 RX ORDER — DEXTROAMPHETAMINE SACCHARATE, AMPHETAMINE ASPARTATE MONOHYDRATE, DEXTROAMPHETAMINE SULFATE AND AMPHETAMINE SULFATE 5; 5; 5; 5 MG/1; MG/1; MG/1; MG/1
20 CAPSULE, EXTENDED RELEASE ORAL DAILY
Qty: 30 CAPSULE | Refills: 0 | Status: SHIPPED | OUTPATIENT
Start: 2025-03-27 | End: 2025-04-26

## 2025-04-23 DIAGNOSIS — F90.9 ATTENTION DEFICIT HYPERACTIVITY DISORDER (ADHD), UNSPECIFIED ADHD TYPE: ICD-10-CM

## 2025-04-23 RX ORDER — DEXTROAMPHETAMINE SACCHARATE, AMPHETAMINE ASPARTATE MONOHYDRATE, DEXTROAMPHETAMINE SULFATE AND AMPHETAMINE SULFATE 5; 5; 5; 5 MG/1; MG/1; MG/1; MG/1
20 CAPSULE, EXTENDED RELEASE ORAL DAILY
Qty: 30 CAPSULE | Refills: 0 | Status: SHIPPED | OUTPATIENT
Start: 2025-04-23 | End: 2025-05-23

## 2025-05-09 DIAGNOSIS — E10.65 TYPE 1 DIABETES MELLITUS WITH HYPERGLYCEMIA (H): ICD-10-CM

## 2025-05-12 RX ORDER — INSULIN ASPART 100 [IU]/ML
INJECTION, SOLUTION INTRAVENOUS; SUBCUTANEOUS
Qty: 90 ML | Refills: 1 | Status: SHIPPED | OUTPATIENT
Start: 2025-05-12

## 2025-05-29 DIAGNOSIS — F90.9 ATTENTION DEFICIT HYPERACTIVITY DISORDER (ADHD), UNSPECIFIED ADHD TYPE: ICD-10-CM

## 2025-05-29 RX ORDER — DEXTROAMPHETAMINE SACCHARATE, AMPHETAMINE ASPARTATE MONOHYDRATE, DEXTROAMPHETAMINE SULFATE AND AMPHETAMINE SULFATE 5; 5; 5; 5 MG/1; MG/1; MG/1; MG/1
20 CAPSULE, EXTENDED RELEASE ORAL DAILY
Qty: 30 CAPSULE | Refills: 0 | Status: SHIPPED | OUTPATIENT
Start: 2025-05-29

## 2025-06-21 ENCOUNTER — HEALTH MAINTENANCE LETTER (OUTPATIENT)
Age: 35
End: 2025-06-21

## 2025-07-02 DIAGNOSIS — F90.9 ATTENTION DEFICIT HYPERACTIVITY DISORDER (ADHD), UNSPECIFIED ADHD TYPE: ICD-10-CM

## 2025-07-03 RX ORDER — DEXTROAMPHETAMINE SACCHARATE, AMPHETAMINE ASPARTATE MONOHYDRATE, DEXTROAMPHETAMINE SULFATE AND AMPHETAMINE SULFATE 5; 5; 5; 5 MG/1; MG/1; MG/1; MG/1
20 CAPSULE, EXTENDED RELEASE ORAL DAILY
Qty: 30 CAPSULE | Refills: 0 | Status: SHIPPED | OUTPATIENT
Start: 2025-07-03

## 2025-07-09 ENCOUNTER — OFFICE VISIT (OUTPATIENT)
Dept: FAMILY MEDICINE | Facility: CLINIC | Age: 35
End: 2025-07-09
Payer: COMMERCIAL

## 2025-07-09 VITALS
RESPIRATION RATE: 20 BRPM | TEMPERATURE: 97.3 F | SYSTOLIC BLOOD PRESSURE: 116 MMHG | HEART RATE: 94 BPM | DIASTOLIC BLOOD PRESSURE: 72 MMHG | HEIGHT: 72 IN | OXYGEN SATURATION: 98 % | BODY MASS INDEX: 32.91 KG/M2 | WEIGHT: 243 LBS

## 2025-07-09 DIAGNOSIS — K21.9 GASTROESOPHAGEAL REFLUX DISEASE WITHOUT ESOPHAGITIS: ICD-10-CM

## 2025-07-09 DIAGNOSIS — E10.65 TYPE 1 DIABETES MELLITUS WITH HYPERGLYCEMIA (H): Primary | ICD-10-CM

## 2025-07-09 DIAGNOSIS — E78.5 HYPERLIPIDEMIA LDL GOAL <100: ICD-10-CM

## 2025-07-09 DIAGNOSIS — F90.9 ATTENTION DEFICIT HYPERACTIVITY DISORDER (ADHD), UNSPECIFIED ADHD TYPE: ICD-10-CM

## 2025-07-09 LAB
EST. AVERAGE GLUCOSE BLD GHB EST-MCNC: 163 MG/DL
HBA1C MFR BLD: 7.3 %

## 2025-07-09 PROCEDURE — 99214 OFFICE O/P EST MOD 30 MIN: CPT | Performed by: PHYSICIAN ASSISTANT

## 2025-07-09 PROCEDURE — 36415 COLL VENOUS BLD VENIPUNCTURE: CPT | Performed by: PHYSICIAN ASSISTANT

## 2025-07-09 PROCEDURE — 1126F AMNT PAIN NOTED NONE PRSNT: CPT | Performed by: PHYSICIAN ASSISTANT

## 2025-07-09 PROCEDURE — 3078F DIAST BP <80 MM HG: CPT | Performed by: PHYSICIAN ASSISTANT

## 2025-07-09 PROCEDURE — G2211 COMPLEX E/M VISIT ADD ON: HCPCS | Performed by: PHYSICIAN ASSISTANT

## 2025-07-09 PROCEDURE — 83036 HEMOGLOBIN GLYCOSYLATED A1C: CPT | Performed by: PHYSICIAN ASSISTANT

## 2025-07-09 PROCEDURE — 3051F HG A1C>EQUAL 7.0%<8.0%: CPT | Performed by: PHYSICIAN ASSISTANT

## 2025-07-09 PROCEDURE — 3074F SYST BP LT 130 MM HG: CPT | Performed by: PHYSICIAN ASSISTANT

## 2025-07-09 RX ORDER — DEXTROAMPHETAMINE SACCHARATE, AMPHETAMINE ASPARTATE MONOHYDRATE, DEXTROAMPHETAMINE SULFATE AND AMPHETAMINE SULFATE 5; 5; 5; 5 MG/1; MG/1; MG/1; MG/1
20 CAPSULE, EXTENDED RELEASE ORAL DAILY
Qty: 30 CAPSULE | Refills: 0 | Status: SHIPPED | OUTPATIENT
Start: 2025-09-07 | End: 2025-10-07

## 2025-07-09 RX ORDER — DEXTROAMPHETAMINE SACCHARATE, AMPHETAMINE ASPARTATE MONOHYDRATE, DEXTROAMPHETAMINE SULFATE AND AMPHETAMINE SULFATE 5; 5; 5; 5 MG/1; MG/1; MG/1; MG/1
20 CAPSULE, EXTENDED RELEASE ORAL DAILY
Qty: 30 CAPSULE | Refills: 0 | Status: SHIPPED | OUTPATIENT
Start: 2025-07-09 | End: 2025-08-08

## 2025-07-09 RX ORDER — DEXTROAMPHETAMINE SACCHARATE, AMPHETAMINE ASPARTATE MONOHYDRATE, DEXTROAMPHETAMINE SULFATE AND AMPHETAMINE SULFATE 5; 5; 5; 5 MG/1; MG/1; MG/1; MG/1
20 CAPSULE, EXTENDED RELEASE ORAL DAILY
Qty: 30 CAPSULE | Refills: 0 | Status: SHIPPED | OUTPATIENT
Start: 2025-08-08 | End: 2025-09-07

## 2025-07-09 RX ORDER — OMEPRAZOLE 40 MG/1
40 CAPSULE, DELAYED RELEASE ORAL DAILY
Qty: 90 CAPSULE | Refills: 3 | Status: SHIPPED | OUTPATIENT
Start: 2025-07-09

## 2025-07-09 SDOH — HEALTH STABILITY: PHYSICAL HEALTH: ON AVERAGE, HOW MANY DAYS PER WEEK DO YOU ENGAGE IN MODERATE TO STRENUOUS EXERCISE (LIKE A BRISK WALK)?: 3 DAYS

## 2025-07-09 SDOH — HEALTH STABILITY: PHYSICAL HEALTH: ON AVERAGE, HOW MANY MINUTES DO YOU ENGAGE IN EXERCISE AT THIS LEVEL?: 20 MIN

## 2025-07-09 ASSESSMENT — PAIN SCALES - GENERAL: PAINLEVEL_OUTOF10: NO PAIN (0)

## 2025-07-09 ASSESSMENT — SOCIAL DETERMINANTS OF HEALTH (SDOH): HOW OFTEN DO YOU GET TOGETHER WITH FRIENDS OR RELATIVES?: ONCE A WEEK
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
